# Patient Record
Sex: MALE | Race: WHITE | Employment: OTHER | URBAN - METROPOLITAN AREA
[De-identification: names, ages, dates, MRNs, and addresses within clinical notes are randomized per-mention and may not be internally consistent; named-entity substitution may affect disease eponyms.]

---

## 2017-01-03 ENCOUNTER — TELEPHONE (OUTPATIENT)
Dept: PRIMARY CARE CLINIC | Age: 82
End: 2017-01-03

## 2017-03-01 RX ORDER — METOPROLOL SUCCINATE 50 MG/1
TABLET, EXTENDED RELEASE ORAL
Qty: 90 TABLET | Refills: 1 | Status: SHIPPED | OUTPATIENT
Start: 2017-03-01 | End: 2017-07-20 | Stop reason: SDUPTHER

## 2017-03-07 DIAGNOSIS — E78.00 HYPERCHOLESTEROLEMIA: ICD-10-CM

## 2017-03-08 RX ORDER — ATORVASTATIN CALCIUM 10 MG/1
TABLET, FILM COATED ORAL
Qty: 90 TABLET | Refills: 1 | Status: SHIPPED | OUTPATIENT
Start: 2017-03-08 | End: 2017-07-20 | Stop reason: SDUPTHER

## 2017-03-20 RX ORDER — TRAMADOL HYDROCHLORIDE 50 MG/1
TABLET ORAL
Qty: 100 TABLET | Refills: 0 | Status: SHIPPED | OUTPATIENT
Start: 2017-03-20 | End: 2017-07-20 | Stop reason: SDUPTHER

## 2017-03-31 PROBLEM — M96.1 POSTLAMINECTOMY SYNDROME, LUMBAR: Status: ACTIVE | Noted: 2017-03-31

## 2017-03-31 PROBLEM — R10.31 ABDOMINAL WALL PAIN IN RIGHT LOWER QUADRANT: Status: ACTIVE | Noted: 2017-03-31

## 2017-04-03 PROBLEM — R26.9 ABNORMALITY OF GAIT: Status: ACTIVE | Noted: 2017-04-03

## 2017-04-29 RX ORDER — TAMSULOSIN HYDROCHLORIDE 0.4 MG/1
CAPSULE ORAL
Qty: 90 CAPSULE | Refills: 1 | Status: SHIPPED | OUTPATIENT
Start: 2017-04-29 | End: 2017-07-20 | Stop reason: SDUPTHER

## 2017-06-19 ENCOUNTER — TELEPHONE (OUTPATIENT)
Dept: PRIMARY CARE CLINIC | Age: 82
End: 2017-06-19

## 2017-07-11 RX ORDER — TRAMADOL HYDROCHLORIDE 50 MG/1
TABLET ORAL
Qty: 100 TABLET | Refills: 0 | OUTPATIENT
Start: 2017-07-11

## 2017-07-20 ENCOUNTER — OFFICE VISIT (OUTPATIENT)
Dept: PRIMARY CARE CLINIC | Age: 82
End: 2017-07-20

## 2017-07-20 VITALS
HEIGHT: 77 IN | RESPIRATION RATE: 14 BRPM | BODY MASS INDEX: 27.04 KG/M2 | HEART RATE: 78 BPM | SYSTOLIC BLOOD PRESSURE: 136 MMHG | WEIGHT: 229 LBS | TEMPERATURE: 97 F | OXYGEN SATURATION: 97 % | DIASTOLIC BLOOD PRESSURE: 92 MMHG

## 2017-07-20 DIAGNOSIS — Z12.5 SCREENING FOR PROSTATE CANCER: ICD-10-CM

## 2017-07-20 DIAGNOSIS — M51.36 DDD (DEGENERATIVE DISC DISEASE), LUMBAR: ICD-10-CM

## 2017-07-20 DIAGNOSIS — E78.5 DYSLIPIDEMIA: ICD-10-CM

## 2017-07-20 DIAGNOSIS — N40.0 BENIGN NON-NODULAR PROSTATIC HYPERPLASIA, PRESENCE OF LOWER URINARY TRACT SYMPTOMS UNSPECIFIED: ICD-10-CM

## 2017-07-20 DIAGNOSIS — R73.9 HYPERGLYCEMIA: ICD-10-CM

## 2017-07-20 DIAGNOSIS — E78.00 HYPERCHOLESTEROLEMIA: ICD-10-CM

## 2017-07-20 DIAGNOSIS — R73.9 HYPERGLYCEMIA: Primary | ICD-10-CM

## 2017-07-20 DIAGNOSIS — I10 HTN (HYPERTENSION), BENIGN: ICD-10-CM

## 2017-07-20 DIAGNOSIS — K21.9 GASTROESOPHAGEAL REFLUX DISEASE WITHOUT ESOPHAGITIS: ICD-10-CM

## 2017-07-20 LAB
ALBUMIN SERPL-MCNC: 4.5 G/DL (ref 3.9–4.9)
ALP BLD-CCNC: 55 U/L (ref 35–104)
ALT SERPL-CCNC: 14 U/L (ref 0–41)
ANION GAP SERPL CALCULATED.3IONS-SCNC: 15 MEQ/L (ref 7–13)
AST SERPL-CCNC: 18 U/L (ref 0–40)
BILIRUB SERPL-MCNC: 0.3 MG/DL (ref 0–1.2)
BILIRUBIN, POC: NORMAL
BLOOD URINE, POC: NORMAL
BUN BLDV-MCNC: 24 MG/DL (ref 8–23)
CALCIUM SERPL-MCNC: 9.2 MG/DL (ref 8.6–10.2)
CHLORIDE BLD-SCNC: 99 MEQ/L (ref 98–107)
CHOLESTEROL, TOTAL: 165 MG/DL (ref 0–199)
CLARITY, POC: CLEAR
CO2: 21 MEQ/L (ref 22–29)
COLOR, POC: YELLOW
CREAT SERPL-MCNC: 1.28 MG/DL (ref 0.7–1.2)
CREATININE URINE: 75.5 MG/DL
GFR AFRICAN AMERICAN: >60
GFR NON-AFRICAN AMERICAN: 54.1
GLOBULIN: 2.7 G/DL (ref 2.3–3.5)
GLUCOSE BLD-MCNC: 94 MG/DL (ref 74–109)
GLUCOSE URINE, POC: NORMAL
HBA1C MFR BLD: 6.4 % (ref 4.8–5.9)
HDLC SERPL-MCNC: 53 MG/DL (ref 40–59)
KETONES, POC: NORMAL
LDL CHOLESTEROL CALCULATED: 92 MG/DL (ref 0–129)
LEUKOCYTE EST, POC: NORMAL
MICROALBUMIN UR-MCNC: 9.5 MG/DL
MICROALBUMIN/CREAT UR-RTO: 125.8 MG/G (ref 0–30)
NITRITE, POC: NORMAL
PH, POC: 6
POTASSIUM SERPL-SCNC: 4.4 MEQ/L (ref 3.5–5.1)
PROSTATE SPECIFIC ANTIGEN: 7.42 NG/ML (ref 0–6.22)
PROTEIN, POC: NORMAL
SODIUM BLD-SCNC: 135 MEQ/L (ref 132–144)
SPECIFIC GRAVITY, POC: 1.02
TOTAL PROTEIN: 7.2 G/DL (ref 6.4–8.1)
TRIGL SERPL-MCNC: 102 MG/DL (ref 0–200)
UROBILINOGEN, POC: 3.5

## 2017-07-20 PROCEDURE — 99213 OFFICE O/P EST LOW 20 MIN: CPT | Performed by: FAMILY MEDICINE

## 2017-07-20 PROCEDURE — 81002 URINALYSIS NONAUTO W/O SCOPE: CPT | Performed by: FAMILY MEDICINE

## 2017-07-20 PROCEDURE — 93000 ELECTROCARDIOGRAM COMPLETE: CPT | Performed by: FAMILY MEDICINE

## 2017-07-20 RX ORDER — TRAMADOL HYDROCHLORIDE 50 MG/1
TABLET ORAL
Qty: 100 TABLET | Refills: 0 | Status: SHIPPED | OUTPATIENT
Start: 2017-07-20 | End: 2017-11-21

## 2017-07-20 RX ORDER — TAMSULOSIN HYDROCHLORIDE 0.4 MG/1
CAPSULE ORAL
Qty: 90 CAPSULE | Refills: 1 | Status: SHIPPED | OUTPATIENT
Start: 2017-07-20 | End: 2018-03-02 | Stop reason: SDUPTHER

## 2017-07-20 RX ORDER — ESOMEPRAZOLE MAGNESIUM 40 MG/1
40 CAPSULE, DELAYED RELEASE ORAL
Qty: 90 CAPSULE | Refills: 1 | Status: SHIPPED | OUTPATIENT
Start: 2017-07-20 | End: 2017-11-21 | Stop reason: SDUPTHER

## 2017-07-20 RX ORDER — METOPROLOL SUCCINATE 50 MG/1
TABLET, EXTENDED RELEASE ORAL
Qty: 90 TABLET | Refills: 1 | Status: SHIPPED | OUTPATIENT
Start: 2017-07-20 | End: 2018-03-02 | Stop reason: SDUPTHER

## 2017-07-20 RX ORDER — ATORVASTATIN CALCIUM 10 MG/1
TABLET, FILM COATED ORAL
Qty: 90 TABLET | Refills: 1 | Status: SHIPPED | OUTPATIENT
Start: 2017-07-20 | End: 2018-03-02 | Stop reason: SDUPTHER

## 2017-07-20 RX ORDER — FINASTERIDE 5 MG/1
TABLET, FILM COATED ORAL
Qty: 90 TABLET | Refills: 1 | Status: SHIPPED | OUTPATIENT
Start: 2017-07-20 | End: 2018-01-11 | Stop reason: SDUPTHER

## 2017-07-20 ASSESSMENT — ENCOUNTER SYMPTOMS
ABDOMINAL DISTENTION: 1
SHORTNESS OF BREATH: 1
BLOOD IN STOOL: 0
ABDOMINAL PAIN: 1
COUGH: 0
CHEST TIGHTNESS: 0

## 2017-07-20 ASSESSMENT — PATIENT HEALTH QUESTIONNAIRE - PHQ9
SUM OF ALL RESPONSES TO PHQ QUESTIONS 1-9: 0
1. LITTLE INTEREST OR PLEASURE IN DOING THINGS: 0
2. FEELING DOWN, DEPRESSED OR HOPELESS: 0
SUM OF ALL RESPONSES TO PHQ9 QUESTIONS 1 & 2: 0

## 2017-09-19 ENCOUNTER — TELEPHONE (OUTPATIENT)
Dept: PRIMARY CARE CLINIC | Age: 82
End: 2017-09-19

## 2017-11-21 ENCOUNTER — OFFICE VISIT (OUTPATIENT)
Dept: PRIMARY CARE CLINIC | Age: 82
End: 2017-11-21

## 2017-11-21 VITALS
SYSTOLIC BLOOD PRESSURE: 134 MMHG | RESPIRATION RATE: 16 BRPM | BODY MASS INDEX: 26.21 KG/M2 | TEMPERATURE: 97.4 F | DIASTOLIC BLOOD PRESSURE: 76 MMHG | WEIGHT: 222 LBS | HEART RATE: 88 BPM | HEIGHT: 77 IN

## 2017-11-21 DIAGNOSIS — R97.20 ELEVATED PSA: ICD-10-CM

## 2017-11-21 DIAGNOSIS — R73.9 HYPERGLYCEMIA: ICD-10-CM

## 2017-11-21 DIAGNOSIS — K21.9 GASTROESOPHAGEAL REFLUX DISEASE WITHOUT ESOPHAGITIS: ICD-10-CM

## 2017-11-21 DIAGNOSIS — G89.4 CHRONIC PAIN DISORDER: ICD-10-CM

## 2017-11-21 DIAGNOSIS — M47.10 OSTEOARTHRITIS OF SPINE WITH MYELOPATHY, UNSPECIFIED SPINAL REGION: Primary | ICD-10-CM

## 2017-11-21 DIAGNOSIS — Z12.5 PROSTATE CANCER SCREENING: ICD-10-CM

## 2017-11-21 DIAGNOSIS — E78.00 HYPERCHOLESTEROLEMIA: ICD-10-CM

## 2017-11-21 DIAGNOSIS — Z23 NEED FOR VACCINATION FOR STREP PNEUMONIAE: ICD-10-CM

## 2017-11-21 DIAGNOSIS — F51.03 PARADOXICAL INSOMNIA: ICD-10-CM

## 2017-11-21 PROBLEM — E78.5 DYSLIPIDEMIA: Status: RESOLVED | Noted: 2017-07-20 | Resolved: 2017-11-21

## 2017-11-21 LAB
ALBUMIN SERPL-MCNC: 4.2 G/DL (ref 3.9–4.9)
ALP BLD-CCNC: 50 U/L (ref 35–104)
ALT SERPL-CCNC: 16 U/L (ref 0–41)
ANION GAP SERPL CALCULATED.3IONS-SCNC: 15 MEQ/L (ref 7–13)
AST SERPL-CCNC: 19 U/L (ref 0–40)
BILIRUB SERPL-MCNC: 0.3 MG/DL (ref 0–1.2)
BUN BLDV-MCNC: 19 MG/DL (ref 8–23)
CALCIUM SERPL-MCNC: 9.6 MG/DL (ref 8.6–10.2)
CHLORIDE BLD-SCNC: 99 MEQ/L (ref 98–107)
CO2: 23 MEQ/L (ref 22–29)
CREAT SERPL-MCNC: 1.22 MG/DL (ref 0.7–1.2)
GFR AFRICAN AMERICAN: >60
GFR NON-AFRICAN AMERICAN: 57.1
GLOBULIN: 2.6 G/DL (ref 2.3–3.5)
GLUCOSE BLD-MCNC: 116 MG/DL
GLUCOSE BLD-MCNC: 98 MG/DL (ref 74–109)
HBA1C MFR BLD: 5.7 %
POTASSIUM SERPL-SCNC: 4.8 MEQ/L (ref 3.5–5.1)
PROSTATE SPECIFIC ANTIGEN: 10.66 NG/ML (ref 0–6.22)
SODIUM BLD-SCNC: 137 MEQ/L (ref 132–144)
TOTAL PROTEIN: 6.8 G/DL (ref 6.4–8.1)

## 2017-11-21 PROCEDURE — 82962 GLUCOSE BLOOD TEST: CPT | Performed by: FAMILY MEDICINE

## 2017-11-21 PROCEDURE — 99214 OFFICE O/P EST MOD 30 MIN: CPT | Performed by: FAMILY MEDICINE

## 2017-11-21 PROCEDURE — 90471 IMMUNIZATION ADMIN: CPT | Performed by: FAMILY MEDICINE

## 2017-11-21 PROCEDURE — 83036 HEMOGLOBIN GLYCOSYLATED A1C: CPT | Performed by: FAMILY MEDICINE

## 2017-11-21 PROCEDURE — 90732 PPSV23 VACC 2 YRS+ SUBQ/IM: CPT | Performed by: FAMILY MEDICINE

## 2017-11-21 RX ORDER — ZOLPIDEM TARTRATE 10 MG/1
10 TABLET ORAL NIGHTLY PRN
COMMUNITY
End: 2017-11-21 | Stop reason: SDUPTHER

## 2017-11-21 RX ORDER — ZOLPIDEM TARTRATE 10 MG/1
10 TABLET ORAL NIGHTLY PRN
Qty: 30 TABLET | Refills: 1 | Status: SHIPPED | OUTPATIENT
Start: 2017-11-21 | End: 2018-03-21 | Stop reason: SDUPTHER

## 2017-11-21 RX ORDER — HYDROCODONE BITARTRATE AND ACETAMINOPHEN 5; 325 MG/1; MG/1
1 TABLET ORAL EVERY 6 HOURS PRN
Qty: 28 TABLET | Refills: 0 | Status: SHIPPED | OUTPATIENT
Start: 2017-11-21 | End: 2018-03-21 | Stop reason: SDUPTHER

## 2017-11-21 RX ORDER — ESOMEPRAZOLE MAGNESIUM 40 MG/1
40 CAPSULE, DELAYED RELEASE ORAL
Qty: 90 CAPSULE | Refills: 1 | Status: SHIPPED | OUTPATIENT
Start: 2017-11-21 | End: 2018-05-16 | Stop reason: SDUPTHER

## 2017-11-21 ASSESSMENT — ENCOUNTER SYMPTOMS
WHEEZING: 0
ABDOMINAL PAIN: 1
SHORTNESS OF BREATH: 0

## 2017-11-21 NOTE — PROGRESS NOTES
Subjective  Harley Sis, [de-identified] y.o. male presents 11/21/2017 with  Chief Complaint   Patient presents with    Diabetes     Patient is here for a follow up on diabetes. He does not check his sugar at home. He currently taking Metformin for his sugar. He denies any cp, dizziness and headaches. He does have occasional sob.  Discuss Medications     Patient would like to change Tramadol to Oxycodone. The Tramadol causes nausea. HPI  Patient comes in for follow-up on diabetes and also type II. He admits he does not monitor his blood sugar on a daily basis. He denies any hypoglycemic reactions. He denies any headaches, nausea, emesis, abdominal pain, shortness of breath or chest pain. Patient wishes to review medications as he admits he continues with right lower abdominal pain. He is status post ventral herniorrhaphy. Patient would like to go from tramadol to oxycodone. HPI    Patient Histories  Past Medical History:   Diagnosis Date    Constipation x 2weeks 6/18/2014    Diverticulosis of sigmoid colon 02/03/2016    DR. MANZO    Hypercholesterolemia     LBP (low back pain) 6/18/2014    LBP (low back pain) 6/18/2014    OA (osteoarthritis)     Polyp, colonic 02/03/2016    DR. MANZO    Smoking hx     Tinea manuum 7/22/2015    Type II or unspecified type diabetes mellitus without mention of complication, not stated as uncontrolled      Past Surgical History:   Procedure Laterality Date    CAROTID ENDARTERECTOMY  8/2/12    CATARACT REMOVAL WITH IMPLANT  9/9/13    [facility] DR. GRIDER     COLONOSCOPY  10/18/2012    [facility]    [facility]    WISDOM TOOTH EXTRACTION  1952     No Known Allergies  Social History     Social History    Marital status: Single     Spouse name: N/A    Number of children: N/A    Years of education: N/A     Occupational History    Not on file.      Social History Main Topics    Smoking status: Former Smoker     Packs/day: 0.50     Types: Cigarettes    Smokeless tobacco: Normal range of motion. Neck supple. No JVD present. No thyromegaly present. Cardiovascular: Normal rate, regular rhythm, normal heart sounds and intact distal pulses. Exam reveals no gallop. No murmur heard. Pulmonary/Chest: Effort normal and breath sounds normal. No respiratory distress. He has no wheezes. Abdominal: Soft. Bowel sounds are normal. He exhibits no distension. There is no tenderness. Musculoskeletal: Normal range of motion. He exhibits no edema. Lymphadenopathy:     He has no cervical adenopathy. Neurological: He is alert and oriented to person, place, and time. He has normal reflexes. No cranial nerve deficit. Skin: Skin is warm and dry. No rash noted. Psychiatric: He has a normal mood and affect. Thought content normal.   Nursing note and vitals reviewed. Assessment & Plan   1. Osteoarthritis of spine with myelopathy, unspecified spinal region  Referral To Unknown External Pain Management   2. Gastroesophageal reflux disease without esophagitis  esomeprazole (NEXIUM) 40 MG delayed release capsule   3. Hyperglycemia  POCT glycosylated hemoglobin (Hb A1C)    POCT Glucose    CANCELED: Hemoglobin A1C   4. Hypercholesterolemia  Comprehensive Metabolic Panel   5. Chronic pain disorder  Referral To Unknown External Pain Management    CANCELED: [employer], - (ARELI) Enrrique Breaux DO   6. Need for vaccination for Strep pneumoniae     7. Prostate cancer screening  Psa screening   8.  Elevated PSA  [address] MD [facility] Urology     Orders Placed This Encounter   Procedures    Pneumococcal polysaccharide vaccine 23-valent greater than or equal to 1yo subcutaneous/IM     [id number]    Comprehensive Metabolic Panel     Standing Status:   Future     Number of Occurrences:   1     Standing Expiration Date:   12/21/2017    Psa screening     Standing Status:   Future     Number of Occurrences:   1     Standing Expiration Date:   12/21/2017   [facility] - Joey Bhardwaj MD [facility] Urology     Referral Priority:   Routine     Referral Type:   Consult for Advice and Opinion     Referral Reason:   Specialty Services Required     Referred to Provider:   Tammy Waldron MD     Requested Specialty:   Urology     Number of Visits Requested:   1    Referral To Unknown External Pain Management     Referral Priority:   Routine     Referral Type:   Consult for Advice and Opinion     Referral Reason:   Specialty Services Required     Requested Specialty:   Pain Management     Number of Visits Requested:   1    POCT glycosylated hemoglobin (Hb A1C)    POCT Glucose     Orders Placed This Encounter   Medications    zolpidem (AMBIEN) 10 MG tablet     Sig: Take 1 tablet by mouth nightly as needed for Sleep . Dispense:  30 tablet     Refill:  1    esomeprazole (NEXIUM) 40 MG delayed release capsule     Sig: Take 1 capsule by mouth every morning (before breakfast) One capsule every other day. Dispense:  90 capsule     Refill:  1    HYDROcodone-acetaminophen (NORCO) 5-325 MG per tablet     Sig: Take 1 tablet by mouth every 6 hours as needed for Pain . Dispense:  28 tablet     Refill:  0     Medications Discontinued During This Encounter   Medication Reason    guaiFENesin-codeine (CHERATUSSIN AC) 100-10 MG/5ML syrup     lidocaine (XYLOCAINE) 5 % ointment     metFORMIN (GLUCOPHAGE) 500 MG tablet     gabapentin (NEURONTIN) 400 MG capsule     gabapentin (NEURONTIN) 600 MG tablet     traMADol (ULTRAM) 50 MG tablet     zolpidem (AMBIEN) 10 MG tablet Reorder    esomeprazole (NEXIUM) 40 MG delayed release capsule Reorder       PATIENT COUNSELED TO CONTINUE ALL CURRENT MEDS     Outpatient Prescriptions Marked as Taking for the 11/21/17 encounter (Office Visit) with Andrews Hung,    Medication Sig Dispense Refill    zolpidem (AMBIEN) 10 MG tablet Take 1 tablet by mouth nightly as needed for Sleep .  30 tablet 1    esomeprazole (NEXIUM) 40 MG delayed release

## 2017-12-01 ENCOUNTER — TELEPHONE (OUTPATIENT)
Dept: UROLOGY | Age: 82
End: 2017-12-01

## 2017-12-05 ENCOUNTER — TELEPHONE (OUTPATIENT)
Dept: PRIMARY CARE CLINIC | Age: 82
End: 2017-12-05

## 2017-12-05 NOTE — TELEPHONE ENCOUNTER
1 was sent to [facility] on 11/21/17 already.  Pleaase look through the chart and call patient back with Dr Mike Choi information

## 2017-12-31 RX ORDER — NEOMYCIN SULFATE, POLYMYXIN B SULFATE AND DEXAMETHASONE 3.5; 10000; 1 MG/ML; [USP'U]/ML; MG/ML
2 SUSPENSION/ DROPS OPHTHALMIC 4 TIMES DAILY
Qty: 1 BOTTLE | Refills: 0 | OUTPATIENT
Start: 2017-12-31 | End: 2018-01-07

## 2017-12-31 NOTE — TELEPHONE ENCOUNTER
This eyedrop has a steroid and it. Please see if patient is agreeable to antibiotic eyedrop without steroid. Otherwise, he may need to come in to walk-in clinic to be seen.  Thank you

## 2018-01-11 DIAGNOSIS — N40.0 BENIGN NON-NODULAR PROSTATIC HYPERPLASIA: ICD-10-CM

## 2018-01-11 DIAGNOSIS — R73.9 HYPERGLYCEMIA: ICD-10-CM

## 2018-01-11 RX ORDER — FINASTERIDE 5 MG/1
TABLET, FILM COATED ORAL
Qty: 90 TABLET | Refills: 1 | Status: SHIPPED | OUTPATIENT
Start: 2018-01-11 | End: 2018-08-04 | Stop reason: SDUPTHER

## 2018-02-19 RX ORDER — TOBRAMYCIN 3 MG/ML
1 SOLUTION/ DROPS OPHTHALMIC EVERY 4 HOURS
Qty: 1 BOTTLE | Refills: 1 | Status: SHIPPED | OUTPATIENT
Start: 2018-02-19 | End: 2018-03-01

## 2018-02-19 NOTE — TELEPHONE ENCOUNTER
Pt called requesting Opthalmologic Sol due to recurrent eye infection   LAST OV Walk-in-Clinic 12/26/2017

## 2018-03-02 DIAGNOSIS — E78.00 HYPERCHOLESTEROLEMIA: ICD-10-CM

## 2018-03-02 DIAGNOSIS — N40.0 BENIGN NON-NODULAR PROSTATIC HYPERPLASIA: ICD-10-CM

## 2018-03-02 DIAGNOSIS — I10 HTN (HYPERTENSION), BENIGN: ICD-10-CM

## 2018-03-03 RX ORDER — METOPROLOL SUCCINATE 50 MG/1
TABLET, EXTENDED RELEASE ORAL
Qty: 90 TABLET | Refills: 1 | Status: SHIPPED | OUTPATIENT
Start: 2018-03-03 | End: 2018-08-04 | Stop reason: SDUPTHER

## 2018-03-03 RX ORDER — ATORVASTATIN CALCIUM 10 MG/1
TABLET, FILM COATED ORAL
Qty: 90 TABLET | Refills: 1 | Status: SHIPPED | OUTPATIENT
Start: 2018-03-03 | End: 2018-09-26 | Stop reason: SDUPTHER

## 2018-03-03 RX ORDER — TAMSULOSIN HYDROCHLORIDE 0.4 MG/1
CAPSULE ORAL
Qty: 90 CAPSULE | Refills: 1 | Status: SHIPPED | OUTPATIENT
Start: 2018-03-03 | End: 2018-09-26 | Stop reason: SDUPTHER

## 2018-03-21 ENCOUNTER — OFFICE VISIT (OUTPATIENT)
Dept: PRIMARY CARE CLINIC | Age: 83
End: 2018-03-21
Payer: COMMERCIAL

## 2018-03-21 VITALS
BODY MASS INDEX: 26.12 KG/M2 | OXYGEN SATURATION: 97 % | RESPIRATION RATE: 16 BRPM | SYSTOLIC BLOOD PRESSURE: 130 MMHG | TEMPERATURE: 97.2 F | HEART RATE: 71 BPM | WEIGHT: 221.2 LBS | HEIGHT: 77 IN | DIASTOLIC BLOOD PRESSURE: 88 MMHG

## 2018-03-21 DIAGNOSIS — R10.31 ABDOMINAL WALL PAIN IN RIGHT LOWER QUADRANT: ICD-10-CM

## 2018-03-21 DIAGNOSIS — M54.50 CHRONIC BILATERAL LOW BACK PAIN WITHOUT SCIATICA: ICD-10-CM

## 2018-03-21 DIAGNOSIS — G89.29 CHRONIC BILATERAL LOW BACK PAIN WITHOUT SCIATICA: ICD-10-CM

## 2018-03-21 DIAGNOSIS — F51.03 PARADOXICAL INSOMNIA: Primary | ICD-10-CM

## 2018-03-21 DIAGNOSIS — Z12.5 PROSTATE CANCER SCREENING: ICD-10-CM

## 2018-03-21 DIAGNOSIS — M47.10 OSTEOARTHRITIS OF SPINE WITH MYELOPATHY, UNSPECIFIED SPINAL REGION: ICD-10-CM

## 2018-03-21 DIAGNOSIS — R73.9 HYPERGLYCEMIA: ICD-10-CM

## 2018-03-21 LAB
GLUCOSE BLD-MCNC: 124 MG/DL
HBA1C MFR BLD: 5.2 %
PROSTATE SPECIFIC ANTIGEN: 11.89 NG/ML (ref 0–6.22)

## 2018-03-21 PROCEDURE — 83036 HEMOGLOBIN GLYCOSYLATED A1C: CPT | Performed by: FAMILY MEDICINE

## 2018-03-21 PROCEDURE — 99214 OFFICE O/P EST MOD 30 MIN: CPT | Performed by: FAMILY MEDICINE

## 2018-03-21 PROCEDURE — 82962 GLUCOSE BLOOD TEST: CPT | Performed by: FAMILY MEDICINE

## 2018-03-21 RX ORDER — ZOLPIDEM TARTRATE 10 MG/1
10 TABLET ORAL NIGHTLY PRN
Qty: 30 TABLET | Refills: 2 | Status: SHIPPED | OUTPATIENT
Start: 2018-03-21 | End: 2019-02-19 | Stop reason: SDUPTHER

## 2018-03-21 RX ORDER — HYDROCODONE BITARTRATE AND ACETAMINOPHEN 5; 325 MG/1; MG/1
1 TABLET ORAL EVERY 6 HOURS PRN
Qty: 28 TABLET | Refills: 0 | Status: SHIPPED | OUTPATIENT
Start: 2018-03-21 | End: 2018-03-28

## 2018-03-21 ASSESSMENT — ENCOUNTER SYMPTOMS
COLOR CHANGE: 0
EYE PAIN: 0
FACIAL SWELLING: 0
EYE DISCHARGE: 0
APNEA: 0
CHEST TIGHTNESS: 0
CHOKING: 0
VISUAL CHANGE: 0
BLURRED VISION: 0
EYE REDNESS: 0
STRIDOR: 0
ABDOMINAL PAIN: 1
PHOTOPHOBIA: 0
BACK PAIN: 1
NAUSEA: 0
CONSTIPATION: 0
DIARRHEA: 0
WHEEZING: 0

## 2018-03-21 NOTE — PROGRESS NOTES
without mention of complication, not stated as uncontrolled      Past Surgical History:   Procedure Laterality Date    CAROTID ENDARTERECTOMY  8/2/12    CATARACT REMOVAL WITH IMPLANT  9/9/13    [facility] DR. GRIDER     COLONOSCOPY  10/18/2012    [facility]3/16    [facility]    UPPER GASTROINTESTINAL ENDOSCOPY  10/31/2017    DR MANZO   [facility] WISDOM TOOTH EXTRACTION  1950     Family History   Problem Relation Age of Onset    Diabetes Mother     Heart Disease Mother     Allergy (Severe) Mother      Social History     Social History    Marital status: Single     Spouse name: N/A    Number of children: N/A    Years of education: N/A     Occupational History    Not on file. Social History Main Topics    Smoking status: Former Smoker     Packs/day: 0.50     Types: Cigarettes    Smokeless tobacco: Never Used    Alcohol use Yes    Drug use: No    Sexual activity: Yes     Partners: Female     Other Topics Concern    Not on file     Social History Narrative    No narrative on file     Allergies:  Patient has no known allergies. Review of Systems   Constitutional: Negative for activity change, appetite change, chills, diaphoresis, fatigue, fever and weight loss. HENT: Negative for congestion, ear discharge, ear pain, facial swelling, hearing loss and mouth sores. Eyes: Negative for blurred vision, photophobia, pain, discharge and redness. Respiratory: Negative for apnea, choking, chest tightness, wheezing and stridor. Cardiovascular: Negative for chest pain, palpitations and leg swelling. Gastrointestinal: Positive for abdominal pain. Negative for constipation, diarrhea and nausea. Endocrine: Negative for cold intolerance, heat intolerance, polydipsia, polyphagia and polyuria. Genitourinary: Negative for dysuria and frequency. Musculoskeletal: Positive for back pain. Negative for gait problem, joint swelling, neck pain and neck stiffness.    Skin: Negative for color change, pallor, rash and wound. Allergic/Immunologic: Negative for immunocompromised state. Neurological: Negative for dizziness, tremors, seizures, syncope, facial asymmetry, speech difficulty, weakness and headaches. Psychiatric/Behavioral: Negative for confusion and hallucinations. The patient is not nervous/anxious and is not hyperactive. Objective:   /88 (Site: Left Arm, Position: Sitting, Cuff Size: Medium Adult)   Pulse 71   Temp 97.2 °F (36.2 °C) (Tympanic)   Resp 16   Ht 6' 5\" (1.956 m)   Wt 221 lb 3.2 oz (100.3 kg)   SpO2 97%   BMI 26.23 kg/m²     Physical Exam   Constitutional: He is oriented to person, place, and time. He appears well-developed and well-nourished. HENT:   Head: Normocephalic and atraumatic. Eyes: Conjunctivae and EOM are normal. Pupils are equal, round, and reactive to light. Neck: Normal range of motion. Neck supple. No JVD present. No thyromegaly present. Cardiovascular: Normal rate, regular rhythm, normal heart sounds and intact distal pulses. Exam reveals no gallop. No murmur heard. Pulmonary/Chest: Effort normal and breath sounds normal. No respiratory distress. He has no wheezes. Abdominal: Soft. Bowel sounds are normal. He exhibits no distension. There is no tenderness. Musculoskeletal: Normal range of motion. He exhibits no edema. Lymphadenopathy:     He has no cervical adenopathy. Neurological: He is alert and oriented to person, place, and time. He has normal reflexes. No cranial nerve deficit. Skin: Skin is warm and dry. No rash noted. Psychiatric: He has a normal mood and affect. Thought content normal.   Nursing note and vitals reviewed. Assessment:     1. Paradoxical insomnia  zolpidem (AMBIEN) 10 MG tablet   2. Hyperglycemia  POCT glycosylated hemoglobin (Hb A1C)    POCT Glucose   3. Chronic bilateral low back pain without sciatica     4. Abdominal wall pain in right lower quadrant     5.  Prostate cancer screening  Psa screening    [facility] - Briana Hamm MD [facility] Urology   6. Osteoarthritis of spine with myelopathy, unspecified spinal region  HYDROcodone-acetaminophen (Marthena Seb) 5-325 MG per tablet       Plan:      Orders Placed This Encounter   Procedures    Psa screening     Standing Status:   Future     Number of Occurrences:   1     Standing Expiration Date:   3/21/2019   [address] [place] [address] Urology     Referral Priority:   Routine     Referral Type:   Consult for Advice and Opinion     Referral Reason:   Specialty Services Required     Referred to Provider:   Bradley Clayton MD     Requested Specialty:   Urology     Number of Visits Requested:   1    POCT glycosylated hemoglobin (Hb A1C)    POCT Glucose     Orders Placed This Encounter   Medications    zolpidem (AMBIEN) 10 MG tablet     Sig: Take 1 tablet by mouth nightly as needed for Sleep for up to 30 days. Dispense:  30 tablet     Refill:  2    HYDROcodone-acetaminophen (NORCO) 5-325 MG per tablet     Sig: Take 1 tablet by mouth every 6 hours as needed for Pain for up to 7 days. Dispense:  28 tablet     Refill:  0       Controlled Substances Monitoring:      Return in about 3 months (around 6/21/2018) for Review of Labs & Diagnostic Testing, Routine follow up. Jorge Zavala CMA  , am scribing for and in the presence of Elliott Jauregui DO. Electronically signed by : Melanie Freed CMA        I, Elliott Jauregui DO, personally performed the services described in this documentation, as scribed by Melanie Freed in my presence, and it is both accurate and complete.  Electronically signed by: Elliott Jauregui DO    3/21/18 9:21 PM    Elliott Jauregui DO

## 2018-04-03 RX ORDER — CYCLOBENZAPRINE HCL 10 MG
10 TABLET ORAL 3 TIMES DAILY PRN
Qty: 90 TABLET | Refills: 1 | Status: SHIPPED | OUTPATIENT
Start: 2018-04-03 | End: 2018-04-13

## 2018-04-04 ENCOUNTER — TELEPHONE (OUTPATIENT)
Dept: PRIMARY CARE CLINIC | Age: 83
End: 2018-04-04

## 2018-04-05 ENCOUNTER — OFFICE VISIT (OUTPATIENT)
Dept: PRIMARY CARE CLINIC | Age: 83
End: 2018-04-05
Payer: COMMERCIAL

## 2018-04-05 VITALS
OXYGEN SATURATION: 98 % | BODY MASS INDEX: 27.14 KG/M2 | TEMPERATURE: 97.5 F | HEIGHT: 77 IN | HEART RATE: 54 BPM | RESPIRATION RATE: 15 BRPM | DIASTOLIC BLOOD PRESSURE: 72 MMHG | SYSTOLIC BLOOD PRESSURE: 116 MMHG | WEIGHT: 229.9 LBS

## 2018-04-05 DIAGNOSIS — M50.30 DDD (DEGENERATIVE DISC DISEASE), CERVICAL: Primary | ICD-10-CM

## 2018-04-05 DIAGNOSIS — M47.10 OSTEOARTHRITIS OF SPINE WITH MYELOPATHY, UNSPECIFIED SPINAL REGION: ICD-10-CM

## 2018-04-05 PROCEDURE — 99214 OFFICE O/P EST MOD 30 MIN: CPT | Performed by: FAMILY MEDICINE

## 2018-04-05 RX ORDER — PREDNISONE 10 MG/1
TABLET ORAL
Qty: 24 TABLET | Refills: 0 | Status: SHIPPED | OUTPATIENT
Start: 2018-04-05 | End: 2018-04-19

## 2018-04-05 ASSESSMENT — ENCOUNTER SYMPTOMS
TROUBLE SWALLOWING: 0
BACK PAIN: 0
VISUAL CHANGE: 0
CONSTIPATION: 0
APNEA: 0
GASTROINTESTINAL NEGATIVE: 1
PHOTOPHOBIA: 0
SHORTNESS OF BREATH: 0
BLOOD IN STOOL: 0
EYE DISCHARGE: 0
CHEST TIGHTNESS: 0
ABDOMINAL PAIN: 0
VOMITING: 0
COUGH: 0
RESPIRATORY NEGATIVE: 1
EYES NEGATIVE: 1
DIARRHEA: 0
NAUSEA: 0

## 2018-05-16 DIAGNOSIS — K21.9 GASTROESOPHAGEAL REFLUX DISEASE WITHOUT ESOPHAGITIS: ICD-10-CM

## 2018-05-16 RX ORDER — ESOMEPRAZOLE MAGNESIUM 40 MG/1
CAPSULE, DELAYED RELEASE ORAL
Qty: 90 CAPSULE | Refills: 0 | Status: SHIPPED | OUTPATIENT
Start: 2018-05-16 | End: 2018-08-13 | Stop reason: SDUPTHER

## 2018-06-28 ENCOUNTER — OFFICE VISIT (OUTPATIENT)
Dept: PRIMARY CARE CLINIC | Age: 83
End: 2018-06-28
Payer: COMMERCIAL

## 2018-06-28 VITALS
TEMPERATURE: 96.8 F | SYSTOLIC BLOOD PRESSURE: 122 MMHG | HEART RATE: 72 BPM | DIASTOLIC BLOOD PRESSURE: 78 MMHG | HEIGHT: 77 IN | BODY MASS INDEX: 26.8 KG/M2 | WEIGHT: 227 LBS | OXYGEN SATURATION: 98 % | RESPIRATION RATE: 16 BRPM

## 2018-06-28 DIAGNOSIS — R10.31 ABDOMINAL WALL PAIN IN RIGHT LOWER QUADRANT: Primary | ICD-10-CM

## 2018-06-28 DIAGNOSIS — E78.00 HYPERCHOLESTEROLEMIA: ICD-10-CM

## 2018-06-28 DIAGNOSIS — I10 HTN (HYPERTENSION), BENIGN: ICD-10-CM

## 2018-06-28 DIAGNOSIS — Z12.5 PROSTATE CANCER SCREENING: ICD-10-CM

## 2018-06-28 DIAGNOSIS — R73.9 HYPERGLYCEMIA: ICD-10-CM

## 2018-06-28 DIAGNOSIS — M51.36 DDD (DEGENERATIVE DISC DISEASE), LUMBAR: ICD-10-CM

## 2018-06-28 PROCEDURE — 99214 OFFICE O/P EST MOD 30 MIN: CPT | Performed by: FAMILY MEDICINE

## 2018-06-28 RX ORDER — HYDROCODONE BITARTRATE AND ACETAMINOPHEN 5; 325 MG/1; MG/1
1 TABLET ORAL EVERY 6 HOURS PRN
COMMUNITY
End: 2018-06-28 | Stop reason: SDUPTHER

## 2018-06-28 RX ORDER — HYDROCODONE BITARTRATE AND ACETAMINOPHEN 5; 325 MG/1; MG/1
1 TABLET ORAL EVERY 6 HOURS PRN
Qty: 28 TABLET | Refills: 0 | Status: SHIPPED | OUTPATIENT
Start: 2018-06-28 | End: 2018-10-17 | Stop reason: SDUPTHER

## 2018-06-28 ASSESSMENT — ENCOUNTER SYMPTOMS
VISUAL CHANGE: 0
CONSTIPATION: 0
EYES NEGATIVE: 1
EYE DISCHARGE: 0
SORE THROAT: 0
NAUSEA: 0
SHORTNESS OF BREATH: 1
ORTHOPNEA: 0
VOMITING: 0
DIARRHEA: 0
PHOTOPHOBIA: 0
BLOOD IN STOOL: 0
GASTROINTESTINAL NEGATIVE: 1
ABDOMINAL PAIN: 0
BACK PAIN: 0
SWOLLEN GLANDS: 0
CHEST TIGHTNESS: 0
APNEA: 0
COUGH: 0
CHANGE IN BOWEL HABIT: 0
BLURRED VISION: 0

## 2018-06-28 NOTE — PROGRESS NOTES
Subjective:      Patient ID: Mae Parker is a [de-identified] y.o. male who presents today for:  Chief Complaint   Patient presents with    Hypertension     follow up. currently taking metoprolol. denies any adverse symptoms but c/o SOB on exertion    Hyperglycemia     follow up. patient is currently taking metformin. Patient denies checking sugar at home. Denies any adverse symptoms. Hypertension   This is a recurrent problem. The current episode started more than 1 year ago. The problem has been rapidly improving since onset. The problem is controlled. Associated symptoms include shortness of breath. Pertinent negatives include no anxiety, blurred vision, chest pain, headaches, malaise/fatigue, neck pain, orthopnea, palpitations, peripheral edema, PND or sweats. Treatments tried: metoprolol. The current treatment provides significant improvement. Hyperglycemia   This is a recurrent problem. The problem has been rapidly improving. Pertinent negatives include no abdominal pain, anorexia, arthralgias, change in bowel habit, chest pain, chills, congestion, coughing, diaphoresis, fatigue, fever, headaches, joint swelling, myalgias, nausea, neck pain, numbness, rash, sore throat, swollen glands, urinary symptoms, vertigo, visual change, vomiting or weakness. Treatments tried: metformin. The treatment provided significant relief. Past Medical History:   Diagnosis Date    Constipation x 2weeks 6/18/2014    Diverticulosis of sigmoid colon 02/03/2016    DR. MANZO    Hypercholesterolemia     LBP (low back pain) 6/18/2014    LBP (low back pain) 6/18/2014    OA (osteoarthritis)     Polyp, colonic 02/03/2016    DR. MANZO    Smoking hx     Tinea manuum 7/22/2015    Type II or unspecified type diabetes mellitus without mention of complication, not stated as uncontrolled      Past Surgical History:   Procedure Laterality Date    CAROTID ENDARTERECTOMY  8/2/12    CATARACT REMOVAL WITH IMPLANT  9/9/13    [facility]  122/78 (Site: Left Arm, Position: Sitting, Cuff Size: Medium Adult)   Pulse 72   Temp 96.8 °F (36 °C) (Tympanic)   Resp 16   Ht 6' 5\" (1.956 m)   Wt 227 lb (103 kg)   SpO2 98%   BMI 26.92 kg/m²     Physical Exam   Constitutional: He is oriented to person, place, and time. He appears well-developed and well-nourished. HENT:   Head: Normocephalic and atraumatic. Eyes: Conjunctivae and EOM are normal. Pupils are equal, round, and reactive to light. Neck: Normal range of motion. Neck supple. No JVD present. No thyromegaly present. Cardiovascular: Normal rate, regular rhythm, normal heart sounds and intact distal pulses. Exam reveals no gallop. No murmur heard. Pulmonary/Chest: Effort normal and breath sounds normal. No respiratory distress. He has no wheezes. Abdominal: Soft. Bowel sounds are normal. He exhibits no distension. There is no tenderness. Musculoskeletal: Normal range of motion. He exhibits no edema. Lymphadenopathy:     He has no cervical adenopathy. Neurological: He is alert and oriented to person, place, and time. He has normal reflexes. No cranial nerve deficit. Skin: Skin is warm and dry. No rash noted. Psychiatric: He has a normal mood and affect. Thought content normal.   Nursing note and vitals reviewed. Assessment:      Diagnosis Orders   1. Abdominal wall pain in right lower quadrant  HYDROcodone-acetaminophen (NORCO) 5-325 MG per tablet   2. Hypercholesterolemia  Comprehensive Metabolic Panel   3. HTN (hypertension), benign     4. Hyperglycemia     5. Prostate cancer screening  Psa screening   6.  DDD (degenerative disc disease), lumbar  External Referral to Physical Therapy       Plan:      Orders Placed This Encounter   Procedures    Comprehensive Metabolic Panel     Standing Status:   Future     Standing Expiration Date:   7/28/2018    Psa screening     Standing Status:   Future     Standing Expiration Date:   7/28/2018    External Referral to Physical Therapy     Referral Priority:   Routine     Referral Type:   Eval and Treat     Referral Reason:   Specialty Services Required     Requested Specialty:   Physical Therapy     Number of Visits Requested:   1     Orders Placed This Encounter   Medications    HYDROcodone-acetaminophen (NORCO) 5-325 MG per tablet     Sig: Take 1 tablet by mouth every 6 hours as needed for Pain for up to 7 days. Mirta Woodrow Date: 6/28/18     Dispense:  28 tablet     Refill:  0       Controlled Substances Monitoring:      Return in about 6 months (around 12/28/2018) for Routine follow up, Review of [facility]. RIAN Bruno, CMA  , am scribing for and in the presence of Evelio Case DO. Electronically signed by :  RIAN Reyna, [address] [facility], Evelio Case DO, personally performed the services described in this documentation, as scribed by Sb Mendoza in my presence, and it is both accurate and complete.  Electronically signed by: DO Evelio Collazo DO

## 2018-08-04 DIAGNOSIS — N40.0 BENIGN NON-NODULAR PROSTATIC HYPERPLASIA: ICD-10-CM

## 2018-08-04 DIAGNOSIS — R73.9 HYPERGLYCEMIA: ICD-10-CM

## 2018-08-04 DIAGNOSIS — I10 HTN (HYPERTENSION), BENIGN: ICD-10-CM

## 2018-08-05 RX ORDER — FINASTERIDE 5 MG/1
TABLET, FILM COATED ORAL
Qty: 90 TABLET | Refills: 1 | Status: SHIPPED | OUTPATIENT
Start: 2018-08-05 | End: 2019-02-04 | Stop reason: SDUPTHER

## 2018-08-05 RX ORDER — METOPROLOL SUCCINATE 50 MG/1
TABLET, EXTENDED RELEASE ORAL
Qty: 90 TABLET | Refills: 1 | Status: SHIPPED | OUTPATIENT
Start: 2018-08-05 | End: 2019-02-04 | Stop reason: SDUPTHER

## 2018-08-13 ENCOUNTER — TELEPHONE (OUTPATIENT)
Dept: PRIMARY CARE CLINIC | Age: 83
End: 2018-08-13

## 2018-08-13 DIAGNOSIS — K21.9 GASTROESOPHAGEAL REFLUX DISEASE WITHOUT ESOPHAGITIS: ICD-10-CM

## 2018-08-13 RX ORDER — ESOMEPRAZOLE MAGNESIUM 40 MG/1
CAPSULE, DELAYED RELEASE ORAL
Qty: 90 CAPSULE | Refills: 1 | Status: SHIPPED | OUTPATIENT
Start: 2018-08-13 | End: 2019-02-24 | Stop reason: SDUPTHER

## 2018-08-13 NOTE — TELEPHONE ENCOUNTER
Patient was last seen on 6-28-18  Last Prescribed 5-16-18    Medication is pending  Please approve or deny this request

## 2018-09-26 DIAGNOSIS — N40.0 BENIGN NON-NODULAR PROSTATIC HYPERPLASIA: ICD-10-CM

## 2018-09-26 DIAGNOSIS — E78.00 HYPERCHOLESTEROLEMIA: ICD-10-CM

## 2018-09-26 RX ORDER — TAMSULOSIN HYDROCHLORIDE 0.4 MG/1
CAPSULE ORAL
Qty: 90 CAPSULE | Refills: 0 | Status: SHIPPED | OUTPATIENT
Start: 2018-09-26 | End: 2018-12-26 | Stop reason: SDUPTHER

## 2018-09-26 RX ORDER — ATORVASTATIN CALCIUM 10 MG/1
TABLET, FILM COATED ORAL
Qty: 90 TABLET | Refills: 0 | Status: SHIPPED | OUTPATIENT
Start: 2018-09-26 | End: 2018-12-26 | Stop reason: SDUPTHER

## 2018-10-15 ENCOUNTER — TELEPHONE (OUTPATIENT)
Dept: PRIMARY CARE CLINIC | Age: 83
End: 2018-10-15

## 2018-10-16 NOTE — TELEPHONE ENCOUNTER
Usually urology will run the show and get PSA when needed. Please let me know. We still track PSAs given the patient's who've been diagnosed with prostate cancer.  Thank you

## 2018-10-17 ENCOUNTER — OFFICE VISIT (OUTPATIENT)
Dept: PRIMARY CARE CLINIC | Age: 83
End: 2018-10-17
Payer: COMMERCIAL

## 2018-10-17 VITALS
WEIGHT: 229.2 LBS | HEIGHT: 77 IN | TEMPERATURE: 97.1 F | OXYGEN SATURATION: 96 % | RESPIRATION RATE: 16 BRPM | BODY MASS INDEX: 27.06 KG/M2 | HEART RATE: 73 BPM | DIASTOLIC BLOOD PRESSURE: 84 MMHG | SYSTOLIC BLOOD PRESSURE: 124 MMHG

## 2018-10-17 DIAGNOSIS — R10.31 ABDOMINAL WALL PAIN IN RIGHT LOWER QUADRANT: ICD-10-CM

## 2018-10-17 DIAGNOSIS — Z12.5 PROSTATE CANCER SCREENING: Primary | ICD-10-CM

## 2018-10-17 DIAGNOSIS — M47.10 OSTEOARTHRITIS OF SPINE WITH MYELOPATHY, UNSPECIFIED SPINAL REGION: ICD-10-CM

## 2018-10-17 PROCEDURE — 99213 OFFICE O/P EST LOW 20 MIN: CPT | Performed by: FAMILY MEDICINE

## 2018-10-17 RX ORDER — HYDROCODONE BITARTRATE AND ACETAMINOPHEN 5; 325 MG/1; MG/1
1 TABLET ORAL EVERY 6 HOURS PRN
Qty: 28 TABLET | Refills: 0 | Status: SHIPPED | OUTPATIENT
Start: 2018-10-17 | End: 2018-12-28 | Stop reason: SDUPTHER

## 2018-10-17 ASSESSMENT — ENCOUNTER SYMPTOMS
CHEST TIGHTNESS: 0
GASTROINTESTINAL NEGATIVE: 1
NAUSEA: 0
SHORTNESS OF BREATH: 0
VOMITING: 0
EYES NEGATIVE: 1
COUGH: 0
DIARRHEA: 0
CONSTIPATION: 0
BLOOD IN STOOL: 0
PHOTOPHOBIA: 0
ABDOMINAL PAIN: 0
EYE DISCHARGE: 0
BACK PAIN: 0
APNEA: 0
RESPIRATORY NEGATIVE: 1

## 2018-10-17 NOTE — PROGRESS NOTES
motion. He exhibits no edema. Lymphadenopathy:     He has no cervical adenopathy. Neurological: He is alert and oriented to person, place, and time. He has normal reflexes. No cranial nerve deficit. Skin: Skin is warm and dry. No rash noted. Psychiatric: He has a normal mood and affect. Thought content normal.   Nursing note and vitals reviewed. Assessment:      Diagnosis Orders   1. Prostate cancer screening  PSA Screening    PSA, free    [address] [address] MD [facility] Urology   2. Osteoarthritis of spine with myelopathy, unspecified spinal region     3. Abdominal wall pain in right lower quadrant  HYDROcodone-acetaminophen (NORCO) 5-325 MG per tablet       Plan:      Orders Placed This Encounter   Procedures    PSA Screening     Standing Status:   Future     Standing Expiration Date:   10/17/2019    PSA, free     Standing Status:   Future     Standing Expiration Date:   10/17/2019   [address] [place] [address] Urology     Referral Priority:   Routine     Referral Type:   Eval and Treat     Referral Reason:   Specialty Services Required     Referred to Provider:   Haritha Christianson MD     Requested Specialty:   Urology     Number of Visits Requested:   1     Orders Placed This Encounter   Medications    HYDROcodone-acetaminophen (Brent Sjogren) 5-325 MG per tablet     Sig: Take 1 tablet by mouth every 6 hours as needed for Pain for up to 7 days. .     Dispense:  28 tablet     Refill:  0       Controlled Substances Monitoring:     Return in about 3 months (around 1/17/2019) for Review of Labs & Diagnostic Testing, Routine follow up. RIAN Flowers, CMA  , am scribing for and in the presence of Guillaume Howe DO. Electronically signed by :RIAN Lopez, [address] [facility], Guillaume Howe DO, personally performed the services described in this documentation, as scribed by Acacia Chiu in my presence, and it is both accurate and complete.  Electronically signed by: Guillaume Howe

## 2018-10-18 DIAGNOSIS — Z12.5 PROSTATE CANCER SCREENING: ICD-10-CM

## 2018-10-22 LAB
PROSTATE SPECIFIC ANTIGEN FREE: 1.9 UG/L
PROSTATE SPECIFIC ANTIGEN PERCENT FREE: 12 %
PROSTATE SPECIFIC ANTIGEN: 15.8 UG/L (ref 0–4)

## 2018-10-29 ENCOUNTER — TELEPHONE (OUTPATIENT)
Dept: PRIMARY CARE CLINIC | Age: 83
End: 2018-10-29

## 2018-12-26 DIAGNOSIS — N40.0 BENIGN NON-NODULAR PROSTATIC HYPERPLASIA: ICD-10-CM

## 2018-12-26 DIAGNOSIS — E78.00 HYPERCHOLESTEROLEMIA: ICD-10-CM

## 2018-12-26 RX ORDER — ATORVASTATIN CALCIUM 10 MG/1
TABLET, FILM COATED ORAL
Qty: 90 TABLET | Refills: 0 | Status: SHIPPED | OUTPATIENT
Start: 2018-12-26 | End: 2019-03-25 | Stop reason: SDUPTHER

## 2018-12-26 RX ORDER — TAMSULOSIN HYDROCHLORIDE 0.4 MG/1
CAPSULE ORAL
Qty: 90 CAPSULE | Refills: 0 | Status: SHIPPED | OUTPATIENT
Start: 2018-12-26 | End: 2019-03-29 | Stop reason: SDUPTHER

## 2018-12-28 ENCOUNTER — OFFICE VISIT (OUTPATIENT)
Dept: PRIMARY CARE CLINIC | Age: 83
End: 2018-12-28
Payer: COMMERCIAL

## 2018-12-28 VITALS
HEART RATE: 101 BPM | SYSTOLIC BLOOD PRESSURE: 122 MMHG | DIASTOLIC BLOOD PRESSURE: 80 MMHG | HEIGHT: 77 IN | OXYGEN SATURATION: 96 % | RESPIRATION RATE: 16 BRPM | BODY MASS INDEX: 26.68 KG/M2 | TEMPERATURE: 97.5 F | WEIGHT: 226 LBS

## 2018-12-28 DIAGNOSIS — E78.00 HYPERCHOLESTEROLEMIA: ICD-10-CM

## 2018-12-28 DIAGNOSIS — R10.31 ABDOMINAL WALL PAIN IN RIGHT LOWER QUADRANT: ICD-10-CM

## 2018-12-28 DIAGNOSIS — D51.9 ANEMIA DUE TO VITAMIN B12 DEFICIENCY, UNSPECIFIED B12 DEFICIENCY TYPE: ICD-10-CM

## 2018-12-28 DIAGNOSIS — C61 PROSTATE CANCER (HCC): Primary | ICD-10-CM

## 2018-12-28 LAB
ALBUMIN SERPL-MCNC: 4.1 G/DL (ref 3.9–4.9)
ALP BLD-CCNC: 55 U/L (ref 35–104)
ALT SERPL-CCNC: 14 U/L (ref 0–41)
ANION GAP SERPL CALCULATED.3IONS-SCNC: 14 MEQ/L (ref 7–13)
AST SERPL-CCNC: 18 U/L (ref 0–40)
BASOPHILS ABSOLUTE: 0 K/UL (ref 0–0.2)
BASOPHILS RELATIVE PERCENT: 0.8 %
BILIRUB SERPL-MCNC: 0.5 MG/DL (ref 0–1.2)
BUN BLDV-MCNC: 24 MG/DL (ref 8–23)
CALCIUM SERPL-MCNC: 8.9 MG/DL (ref 8.6–10.2)
CHLORIDE BLD-SCNC: 102 MEQ/L (ref 98–107)
CHOLESTEROL, TOTAL: 129 MG/DL (ref 0–199)
CO2: 21 MEQ/L (ref 22–29)
CREAT SERPL-MCNC: 1.32 MG/DL (ref 0.7–1.2)
EOSINOPHILS ABSOLUTE: 0.1 K/UL (ref 0–0.7)
EOSINOPHILS RELATIVE PERCENT: 1.3 %
GFR AFRICAN AMERICAN: >60
GFR NON-AFRICAN AMERICAN: 52
GLOBULIN: 2.9 G/DL (ref 2.3–3.5)
GLUCOSE BLD-MCNC: 105 MG/DL (ref 74–109)
HCT VFR BLD CALC: 31.4 % (ref 42–52)
HDLC SERPL-MCNC: 51 MG/DL (ref 40–59)
HEMOGLOBIN: 10.4 G/DL (ref 14–18)
LDL CHOLESTEROL CALCULATED: 64 MG/DL (ref 0–129)
LYMPHOCYTES ABSOLUTE: 1.3 K/UL (ref 1–4.8)
LYMPHOCYTES RELATIVE PERCENT: 19.3 %
MCH RBC QN AUTO: 30.8 PG (ref 27–31.3)
MCHC RBC AUTO-ENTMCNC: 33.1 % (ref 33–37)
MCV RBC AUTO: 93 FL (ref 80–100)
MONOCYTES ABSOLUTE: 0.6 K/UL (ref 0.2–0.8)
MONOCYTES RELATIVE PERCENT: 8.7 %
NEUTROPHILS ABSOLUTE: 4.6 K/UL (ref 1.4–6.5)
NEUTROPHILS RELATIVE PERCENT: 69.9 %
PDW BLD-RTO: 15.1 % (ref 11.5–14.5)
PLATELET # BLD: 161 K/UL (ref 130–400)
POTASSIUM SERPL-SCNC: 4.8 MEQ/L (ref 3.5–5.1)
RBC # BLD: 3.38 M/UL (ref 4.7–6.1)
SODIUM BLD-SCNC: 137 MEQ/L (ref 132–144)
TOTAL PROTEIN: 7 G/DL (ref 6.4–8.1)
TRIGL SERPL-MCNC: 70 MG/DL (ref 0–200)
WBC # BLD: 6.6 K/UL (ref 4.8–10.8)

## 2018-12-28 PROCEDURE — 99213 OFFICE O/P EST LOW 20 MIN: CPT | Performed by: FAMILY MEDICINE

## 2018-12-28 RX ORDER — HYDROCODONE BITARTRATE AND ACETAMINOPHEN 5; 325 MG/1; MG/1
1 TABLET ORAL EVERY 6 HOURS PRN
Qty: 28 TABLET | Refills: 0 | Status: SHIPPED | OUTPATIENT
Start: 2018-12-28 | End: 2019-02-19 | Stop reason: SDUPTHER

## 2018-12-28 ASSESSMENT — ENCOUNTER SYMPTOMS
PHOTOPHOBIA: 0
CHEST TIGHTNESS: 0
RESPIRATORY NEGATIVE: 1
COUGH: 0
VOMITING: 0
SHORTNESS OF BREATH: 0
EYE DISCHARGE: 0
DIARRHEA: 0
ABDOMINAL PAIN: 0
BACK PAIN: 0
APNEA: 0
NAUSEA: 0
GASTROINTESTINAL NEGATIVE: 1
CONSTIPATION: 0
BLOOD IN STOOL: 0
EYES NEGATIVE: 1

## 2018-12-28 ASSESSMENT — PATIENT HEALTH QUESTIONNAIRE - PHQ9
SUM OF ALL RESPONSES TO PHQ9 QUESTIONS 1 & 2: 0
SUM OF ALL RESPONSES TO PHQ QUESTIONS 1-9: 0
SUM OF ALL RESPONSES TO PHQ QUESTIONS 1-9: 0
1. LITTLE INTEREST OR PLEASURE IN DOING THINGS: 0
2. FEELING DOWN, DEPRESSED OR HOPELESS: 0

## 2018-12-28 NOTE — PROGRESS NOTES
exhibits no discharge. Neck: Normal range of motion. Neck supple. No JVD present. No thyromegaly present. Cardiovascular: Normal rate, regular rhythm, normal heart sounds and intact distal pulses. Exam reveals no gallop. No murmur heard. Pulmonary/Chest: Effort normal and breath sounds normal. No respiratory distress. He has no wheezes. Abdominal: Soft. Bowel sounds are normal. He exhibits no distension and no mass. There is no tenderness. There is no rebound. Genitourinary: Rectum normal.   Musculoskeletal: Normal range of motion. He exhibits no edema. Lymphadenopathy:     He has no cervical adenopathy. Neurological: He is alert and oriented to person, place, and time. He has normal reflexes. No cranial nerve deficit. Skin: Skin is warm and dry. No rash noted. No pallor. Psychiatric: He has a normal mood and affect. Assessment:      Diagnosis Orders   1. Prostate cancer ([facility] [address]     2. Hypercholesterolemia  Comprehensive Metabolic Panel    Lipid Panel   3. Anemia due to vitamin B12 deficiency, unspecified B12 deficiency type  CBC Auto Differential   4. Abdominal wall pain in right lower quadrant  HYDROcodone-acetaminophen (NORCO) 5-325 MG per tablet       Plan:      Orders Placed This Encounter   Procedures    Comprehensive Metabolic Panel     Standing Status:   Future     Number of Occurrences:   1     Standing Expiration Date:   1/28/2019    CBC Auto Differential     Standing Status:   Future     Number of Occurrences:   1     Standing Expiration Date:   12/28/2019    Lipid Panel     Standing Status:   Future     Number of Occurrences:   1     Standing Expiration Date:   1/28/2019     Order Specific Question:   Is Patient Fasting?/# of Hours     Answer:   yes     Orders Placed This Encounter   Medications    HYDROcodone-acetaminophen ([address]) 5-325 MG per tablet     Sig: Take 1 tablet by mouth every 6 hours as needed for Pain for up to 7 days. .     Dispense:  28 tablet     Refill:  0

## 2018-12-31 ENCOUNTER — TELEPHONE (OUTPATIENT)
Dept: PRIMARY CARE CLINIC | Age: 83
End: 2018-12-31

## 2019-02-04 DIAGNOSIS — I10 HTN (HYPERTENSION), BENIGN: ICD-10-CM

## 2019-02-04 DIAGNOSIS — N40.0 BENIGN NON-NODULAR PROSTATIC HYPERPLASIA: ICD-10-CM

## 2019-02-04 RX ORDER — METOPROLOL SUCCINATE 50 MG/1
TABLET, EXTENDED RELEASE ORAL
Qty: 90 TABLET | Refills: 1 | Status: SHIPPED | OUTPATIENT
Start: 2019-02-04 | End: 2019-04-16 | Stop reason: SDUPTHER

## 2019-02-04 RX ORDER — FINASTERIDE 5 MG/1
TABLET, FILM COATED ORAL
Qty: 90 TABLET | Refills: 1 | Status: SHIPPED | OUTPATIENT
Start: 2019-02-04

## 2019-02-15 ENCOUNTER — TELEPHONE (OUTPATIENT)
Dept: FAMILY MEDICINE CLINIC | Age: 84
End: 2019-02-15

## 2019-02-19 ENCOUNTER — OFFICE VISIT (OUTPATIENT)
Dept: PRIMARY CARE CLINIC | Age: 84
End: 2019-02-19
Payer: COMMERCIAL

## 2019-02-19 VITALS
DIASTOLIC BLOOD PRESSURE: 62 MMHG | RESPIRATION RATE: 12 BRPM | BODY MASS INDEX: 26.33 KG/M2 | TEMPERATURE: 97.2 F | HEART RATE: 54 BPM | SYSTOLIC BLOOD PRESSURE: 122 MMHG | HEIGHT: 77 IN | WEIGHT: 223 LBS | OXYGEN SATURATION: 99 %

## 2019-02-19 DIAGNOSIS — C61 PROSTATE CANCER (HCC): ICD-10-CM

## 2019-02-19 DIAGNOSIS — R06.09 DOE (DYSPNEA ON EXERTION): Primary | ICD-10-CM

## 2019-02-19 DIAGNOSIS — R10.31 ABDOMINAL WALL PAIN IN RIGHT LOWER QUADRANT: ICD-10-CM

## 2019-02-19 DIAGNOSIS — E78.00 HYPERCHOLESTEROLEMIA: ICD-10-CM

## 2019-02-19 DIAGNOSIS — I48.0 PAROXYSMAL ATRIAL FIBRILLATION (HCC): ICD-10-CM

## 2019-02-19 DIAGNOSIS — F51.03 PARADOXICAL INSOMNIA: ICD-10-CM

## 2019-02-19 DIAGNOSIS — R06.02 SOB (SHORTNESS OF BREATH): ICD-10-CM

## 2019-02-19 LAB
ALBUMIN SERPL-MCNC: 4.3 G/DL (ref 3.5–4.6)
ALP BLD-CCNC: 59 U/L (ref 35–104)
ALT SERPL-CCNC: 14 U/L (ref 0–41)
ANION GAP SERPL CALCULATED.3IONS-SCNC: 16 MEQ/L (ref 9–15)
AST SERPL-CCNC: 22 U/L (ref 0–40)
BILIRUB SERPL-MCNC: 0.5 MG/DL (ref 0.2–0.7)
BUN BLDV-MCNC: 23 MG/DL (ref 8–23)
CALCIUM SERPL-MCNC: 9.7 MG/DL (ref 8.5–9.9)
CHLORIDE BLD-SCNC: 97 MEQ/L (ref 95–107)
CO2: 22 MEQ/L (ref 20–31)
CREAT SERPL-MCNC: 1.3 MG/DL (ref 0.7–1.2)
GFR AFRICAN AMERICAN: >60
GFR NON-AFRICAN AMERICAN: 52.9
GLOBULIN: 3.3 G/DL (ref 2.3–3.5)
GLUCOSE BLD-MCNC: 111 MG/DL (ref 70–99)
HCT VFR BLD CALC: 32.8 % (ref 42–52)
HEMOGLOBIN: 10.7 G/DL (ref 14–18)
MCH RBC QN AUTO: 31.1 PG (ref 27–31.3)
MCHC RBC AUTO-ENTMCNC: 32.7 % (ref 33–37)
MCV RBC AUTO: 94.9 FL (ref 80–100)
PDW BLD-RTO: 15.2 % (ref 11.5–14.5)
PLATELET # BLD: 175 K/UL (ref 130–400)
POTASSIUM SERPL-SCNC: 5 MEQ/L (ref 3.4–4.9)
PROSTATE SPECIFIC ANTIGEN: 22.6 NG/ML (ref 0–6.22)
RBC # BLD: 3.45 M/UL (ref 4.7–6.1)
SODIUM BLD-SCNC: 135 MEQ/L (ref 135–144)
TOTAL PROTEIN: 7.6 G/DL (ref 6.3–8)
WBC # BLD: 5.5 K/UL (ref 4.8–10.8)

## 2019-02-19 PROCEDURE — 99214 OFFICE O/P EST MOD 30 MIN: CPT | Performed by: FAMILY MEDICINE

## 2019-02-19 PROCEDURE — 93000 ELECTROCARDIOGRAM COMPLETE: CPT | Performed by: FAMILY MEDICINE

## 2019-02-19 RX ORDER — HYDROCODONE BITARTRATE AND ACETAMINOPHEN 5; 325 MG/1; MG/1
1 TABLET ORAL EVERY 6 HOURS PRN
Qty: 28 TABLET | Refills: 0 | Status: SHIPPED | OUTPATIENT
Start: 2019-02-19 | End: 2019-02-26

## 2019-02-19 RX ORDER — ZOLPIDEM TARTRATE 10 MG/1
10 TABLET ORAL NIGHTLY PRN
Qty: 30 TABLET | Refills: 2 | Status: SHIPPED | OUTPATIENT
Start: 2019-02-19 | End: 2019-03-21

## 2019-02-19 ASSESSMENT — ENCOUNTER SYMPTOMS
EYES NEGATIVE: 1
RHINORRHEA: 0
PHOTOPHOBIA: 0
BLOOD IN STOOL: 0
SPUTUM PRODUCTION: 0
NAUSEA: 0
CONSTIPATION: 0
BELCHING: 0
SORE THROAT: 0
WHEEZING: 0
EYE DISCHARGE: 0
ABDOMINAL PAIN: 1
CHEST TIGHTNESS: 0
SWOLLEN GLANDS: 0
BACK PAIN: 0
HEMOPTYSIS: 0
VOMITING: 0
ORTHOPNEA: 0
APNEA: 0
SHORTNESS OF BREATH: 1
COUGH: 0
HEMATOCHEZIA: 0
FLATUS: 0
DIARRHEA: 0

## 2019-02-19 ASSESSMENT — PATIENT HEALTH QUESTIONNAIRE - PHQ9
SUM OF ALL RESPONSES TO PHQ QUESTIONS 1-9: 1
SUM OF ALL RESPONSES TO PHQ QUESTIONS 1-9: 1
SUM OF ALL RESPONSES TO PHQ9 QUESTIONS 1 & 2: 1
1. LITTLE INTEREST OR PLEASURE IN DOING THINGS: 0
2. FEELING DOWN, DEPRESSED OR HOPELESS: 1

## 2019-02-19 ASSESSMENT — CROHNS DISEASE ACTIVITY INDEX (CDAI): CDAI SCORE: 0

## 2019-02-20 DIAGNOSIS — G89.29 CHRONIC LOW BACK PAIN WITHOUT SCIATICA, UNSPECIFIED BACK PAIN LATERALITY: Primary | ICD-10-CM

## 2019-02-20 DIAGNOSIS — M54.50 CHRONIC LOW BACK PAIN WITHOUT SCIATICA, UNSPECIFIED BACK PAIN LATERALITY: Primary | ICD-10-CM

## 2019-02-20 DIAGNOSIS — R26.9 ABNORMALITY OF GAIT: ICD-10-CM

## 2019-02-20 PROBLEM — R06.02 SOB (SHORTNESS OF BREATH): Status: ACTIVE | Noted: 2019-02-20

## 2019-02-21 ENCOUNTER — OFFICE VISIT (OUTPATIENT)
Dept: CARDIOLOGY CLINIC | Age: 84
End: 2019-02-21
Payer: COMMERCIAL

## 2019-02-21 VITALS
WEIGHT: 223 LBS | DIASTOLIC BLOOD PRESSURE: 82 MMHG | BODY MASS INDEX: 26.33 KG/M2 | RESPIRATION RATE: 20 BRPM | HEART RATE: 53 BPM | OXYGEN SATURATION: 98 % | SYSTOLIC BLOOD PRESSURE: 122 MMHG | HEIGHT: 77 IN

## 2019-02-21 DIAGNOSIS — Z98.890 H/O CAROTID ENDARTERECTOMY: ICD-10-CM

## 2019-02-21 DIAGNOSIS — I25.10 ATHEROSCLEROSIS OF NATIVE CORONARY ARTERY OF NATIVE HEART WITHOUT ANGINA PECTORIS: ICD-10-CM

## 2019-02-21 DIAGNOSIS — I47.20 VT (VENTRICULAR TACHYCARDIA): ICD-10-CM

## 2019-02-21 DIAGNOSIS — I50.42 CHRONIC COMBINED SYSTOLIC AND DIASTOLIC CONGESTIVE HEART FAILURE (HCC): ICD-10-CM

## 2019-02-21 DIAGNOSIS — R06.02 SOB (SHORTNESS OF BREATH): Primary | ICD-10-CM

## 2019-02-21 PROBLEM — I50.40 COMBINED SYSTOLIC AND DIASTOLIC CONGESTIVE HEART FAILURE (HCC): Status: ACTIVE | Noted: 2019-02-21

## 2019-02-21 PROCEDURE — 99243 OFF/OP CNSLTJ NEW/EST LOW 30: CPT | Performed by: INTERNAL MEDICINE

## 2019-02-21 RX ORDER — FUROSEMIDE 20 MG/1
20 TABLET ORAL DAILY PRN
Qty: 30 TABLET | Refills: 11 | Status: SHIPPED | OUTPATIENT
Start: 2019-02-21 | End: 2019-04-19 | Stop reason: SDUPTHER

## 2019-02-21 ASSESSMENT — ENCOUNTER SYMPTOMS
FACIAL SWELLING: 0
SHORTNESS OF BREATH: 1
NAUSEA: 0
CHEST TIGHTNESS: 0
COUGH: 0
BLOOD IN STOOL: 0
VOICE CHANGE: 0
VOMITING: 0
TROUBLE SWALLOWING: 0
ANAL BLEEDING: 0
ABDOMINAL DISTENTION: 0
WHEEZING: 0
DIARRHEA: 0
COLOR CHANGE: 0
APNEA: 0
ABDOMINAL PAIN: 0

## 2019-02-24 DIAGNOSIS — K21.9 GASTROESOPHAGEAL REFLUX DISEASE WITHOUT ESOPHAGITIS: ICD-10-CM

## 2019-02-24 RX ORDER — ESOMEPRAZOLE MAGNESIUM 40 MG/1
CAPSULE, DELAYED RELEASE ORAL
Qty: 90 CAPSULE | Refills: 1 | Status: SHIPPED | OUTPATIENT
Start: 2019-02-24

## 2019-02-26 ENCOUNTER — TELEPHONE (OUTPATIENT)
Dept: PRIMARY CARE CLINIC | Age: 84
End: 2019-02-26

## 2019-03-05 ENCOUNTER — TELEPHONE (OUTPATIENT)
Dept: PRIMARY CARE CLINIC | Age: 84
End: 2019-03-05

## 2019-03-07 ENCOUNTER — TELEPHONE (OUTPATIENT)
Dept: CARDIOLOGY CLINIC | Age: 84
End: 2019-03-07

## 2019-03-14 ENCOUNTER — OFFICE VISIT (OUTPATIENT)
Dept: CARDIOLOGY CLINIC | Age: 84
End: 2019-03-14
Payer: COMMERCIAL

## 2019-03-14 VITALS
WEIGHT: 221 LBS | OXYGEN SATURATION: 94 % | HEART RATE: 62 BPM | DIASTOLIC BLOOD PRESSURE: 80 MMHG | BODY MASS INDEX: 26.09 KG/M2 | RESPIRATION RATE: 22 BRPM | HEIGHT: 77 IN | SYSTOLIC BLOOD PRESSURE: 124 MMHG

## 2019-03-14 DIAGNOSIS — R10.31 ABDOMINAL WALL PAIN IN RIGHT LOWER QUADRANT: ICD-10-CM

## 2019-03-14 DIAGNOSIS — I48.91 ATRIAL FIBRILLATION, UNSPECIFIED TYPE (HCC): Primary | ICD-10-CM

## 2019-03-14 DIAGNOSIS — Z98.890 H/O CAROTID ENDARTERECTOMY: ICD-10-CM

## 2019-03-14 DIAGNOSIS — R06.02 SOB (SHORTNESS OF BREATH): ICD-10-CM

## 2019-03-14 DIAGNOSIS — I50.42 CHRONIC COMBINED SYSTOLIC AND DIASTOLIC CONGESTIVE HEART FAILURE (HCC): ICD-10-CM

## 2019-03-14 PROCEDURE — 99214 OFFICE O/P EST MOD 30 MIN: CPT | Performed by: INTERNAL MEDICINE

## 2019-03-14 RX ORDER — HYDROCODONE BITARTRATE AND ACETAMINOPHEN 5; 325 MG/1; MG/1
1 TABLET ORAL
COMMUNITY
End: 2019-03-26 | Stop reason: SDUPTHER

## 2019-03-14 ASSESSMENT — ENCOUNTER SYMPTOMS
WHEEZING: 0
ABDOMINAL DISTENTION: 0
BLOOD IN STOOL: 0
VOMITING: 0
DIARRHEA: 0
TROUBLE SWALLOWING: 0
SHORTNESS OF BREATH: 1
ANAL BLEEDING: 0
NAUSEA: 0
APNEA: 0
VOICE CHANGE: 0
FACIAL SWELLING: 0
COLOR CHANGE: 0
CHEST TIGHTNESS: 0

## 2019-03-25 DIAGNOSIS — E78.00 HYPERCHOLESTEROLEMIA: ICD-10-CM

## 2019-03-25 RX ORDER — HYDROCODONE BITARTRATE AND ACETAMINOPHEN 5; 325 MG/1; MG/1
1 TABLET ORAL
Status: CANCELLED | OUTPATIENT
Start: 2019-03-25

## 2019-03-25 RX ORDER — ATORVASTATIN CALCIUM 10 MG/1
TABLET, FILM COATED ORAL
Qty: 90 TABLET | Refills: 0 | Status: SHIPPED | OUTPATIENT
Start: 2019-03-25 | End: 2019-06-26 | Stop reason: SDUPTHER

## 2019-03-26 ENCOUNTER — OFFICE VISIT (OUTPATIENT)
Dept: PRIMARY CARE CLINIC | Age: 84
End: 2019-03-26
Payer: COMMERCIAL

## 2019-03-26 ENCOUNTER — TELEPHONE (OUTPATIENT)
Dept: PRIMARY CARE CLINIC | Age: 84
End: 2019-03-26

## 2019-03-26 VITALS
DIASTOLIC BLOOD PRESSURE: 70 MMHG | WEIGHT: 227 LBS | BODY MASS INDEX: 26.8 KG/M2 | OXYGEN SATURATION: 99 % | SYSTOLIC BLOOD PRESSURE: 112 MMHG | RESPIRATION RATE: 16 BRPM | HEIGHT: 77 IN | TEMPERATURE: 97.4 F | HEART RATE: 62 BPM

## 2019-03-26 DIAGNOSIS — D64.9 ANEMIA, UNSPECIFIED TYPE: ICD-10-CM

## 2019-03-26 DIAGNOSIS — R10.84 GENERALIZED ABDOMINAL PAIN: ICD-10-CM

## 2019-03-26 DIAGNOSIS — R06.02 SOB (SHORTNESS OF BREATH) ON EXERTION: ICD-10-CM

## 2019-03-26 DIAGNOSIS — N40.0 BENIGN NON-NODULAR PROSTATIC HYPERPLASIA: ICD-10-CM

## 2019-03-26 DIAGNOSIS — J18.9 PNEUMONIA OF RIGHT MIDDLE LOBE DUE TO INFECTIOUS ORGANISM: ICD-10-CM

## 2019-03-26 DIAGNOSIS — R06.02 SOB (SHORTNESS OF BREATH) ON EXERTION: Primary | ICD-10-CM

## 2019-03-26 LAB
ALBUMIN SERPL-MCNC: 4.1 G/DL (ref 3.5–4.6)
ALP BLD-CCNC: 62 U/L (ref 35–104)
ALT SERPL-CCNC: 16 U/L (ref 0–41)
ANION GAP SERPL CALCULATED.3IONS-SCNC: 17 MEQ/L (ref 9–15)
AST SERPL-CCNC: 20 U/L (ref 0–40)
BASOPHILS ABSOLUTE: 0 K/UL (ref 0–0.2)
BASOPHILS RELATIVE PERCENT: 0.8 %
BILIRUB SERPL-MCNC: 0.5 MG/DL (ref 0.2–0.7)
BUN BLDV-MCNC: 28 MG/DL (ref 8–23)
CALCIUM SERPL-MCNC: 9 MG/DL (ref 8.5–9.9)
CHLORIDE BLD-SCNC: 103 MEQ/L (ref 95–107)
CO2: 18 MEQ/L (ref 20–31)
CREAT SERPL-MCNC: 1.46 MG/DL (ref 0.7–1.2)
EOSINOPHILS ABSOLUTE: 0 K/UL (ref 0–0.7)
EOSINOPHILS RELATIVE PERCENT: 0.5 %
FOLATE: 16 NG/ML (ref 7.3–26.1)
GFR AFRICAN AMERICAN: 56
GFR NON-AFRICAN AMERICAN: 46.3
GLOBULIN: 2.8 G/DL (ref 2.3–3.5)
GLUCOSE BLD-MCNC: 140 MG/DL (ref 70–99)
HCT VFR BLD CALC: 29.9 % (ref 42–52)
HEMOGLOBIN: 9.8 G/DL (ref 14–18)
IRON SATURATION: 15 % (ref 11–46)
IRON: 42 UG/DL (ref 59–158)
LYMPHOCYTES ABSOLUTE: 0.7 K/UL (ref 1–4.8)
LYMPHOCYTES RELATIVE PERCENT: 14.7 %
MCH RBC QN AUTO: 30.9 PG (ref 27–31.3)
MCHC RBC AUTO-ENTMCNC: 32.7 % (ref 33–37)
MCV RBC AUTO: 94.7 FL (ref 80–100)
MONOCYTES ABSOLUTE: 0.4 K/UL (ref 0.2–0.8)
MONOCYTES RELATIVE PERCENT: 9.5 %
NEUTROPHILS ABSOLUTE: 3.5 K/UL (ref 1.4–6.5)
NEUTROPHILS RELATIVE PERCENT: 74.5 %
PDW BLD-RTO: 16 % (ref 11.5–14.5)
PLATELET # BLD: 151 K/UL (ref 130–400)
POTASSIUM SERPL-SCNC: 4.9 MEQ/L (ref 3.4–4.9)
RBC # BLD: 3.16 M/UL (ref 4.7–6.1)
SODIUM BLD-SCNC: 138 MEQ/L (ref 135–144)
TOTAL IRON BINDING CAPACITY: 283 UG/DL (ref 178–450)
TOTAL PROTEIN: 6.9 G/DL (ref 6.3–8)
VITAMIN B-12: 313 PG/ML (ref 232–1245)
WBC # BLD: 4.7 K/UL (ref 4.8–10.8)

## 2019-03-26 PROCEDURE — 99214 OFFICE O/P EST MOD 30 MIN: CPT | Performed by: NURSE PRACTITIONER

## 2019-03-26 RX ORDER — PREDNISONE 10 MG/1
TABLET ORAL
Qty: 18 TABLET | Refills: 0 | Status: SHIPPED | OUTPATIENT
Start: 2019-03-26 | End: 2019-04-04

## 2019-03-26 RX ORDER — ALBUTEROL SULFATE 90 UG/1
2 AEROSOL, METERED RESPIRATORY (INHALATION) EVERY 6 HOURS PRN
Qty: 1 INHALER | Refills: 0 | Status: ON HOLD | OUTPATIENT
Start: 2019-03-26 | End: 2020-12-01

## 2019-03-26 RX ORDER — AZITHROMYCIN 250 MG/1
TABLET, FILM COATED ORAL
Qty: 1 PACKET | Refills: 0 | Status: SHIPPED | OUTPATIENT
Start: 2019-03-26 | End: 2019-07-23 | Stop reason: ALTCHOICE

## 2019-03-26 RX ORDER — HYDROCODONE BITARTRATE AND ACETAMINOPHEN 5; 325 MG/1; MG/1
1 TABLET ORAL EVERY 6 HOURS PRN
Qty: 28 TABLET | Refills: 0 | Status: SHIPPED | OUTPATIENT
Start: 2019-03-26 | End: 2019-04-02

## 2019-03-26 ASSESSMENT — ENCOUNTER SYMPTOMS
VOMITING: 0
RECTAL PAIN: 0
ANAL BLEEDING: 0
CONSTIPATION: 0
ABDOMINAL PAIN: 1
BLOOD IN STOOL: 0
DIARRHEA: 0
SHORTNESS OF BREATH: 1
NAUSEA: 0
WHEEZING: 0
ABDOMINAL DISTENTION: 0
COUGH: 1

## 2019-03-29 ENCOUNTER — OFFICE VISIT (OUTPATIENT)
Dept: PRIMARY CARE CLINIC | Age: 84
End: 2019-03-29
Payer: COMMERCIAL

## 2019-03-29 VITALS
OXYGEN SATURATION: 99 % | TEMPERATURE: 97.5 F | HEART RATE: 64 BPM | BODY MASS INDEX: 27.31 KG/M2 | WEIGHT: 231.3 LBS | DIASTOLIC BLOOD PRESSURE: 64 MMHG | HEIGHT: 77 IN | SYSTOLIC BLOOD PRESSURE: 116 MMHG

## 2019-03-29 DIAGNOSIS — R06.09 DOE (DYSPNEA ON EXERTION): ICD-10-CM

## 2019-03-29 DIAGNOSIS — I50.9 CONGESTIVE HEART FAILURE, UNSPECIFIED HF CHRONICITY, UNSPECIFIED HEART FAILURE TYPE (HCC): Primary | ICD-10-CM

## 2019-03-29 DIAGNOSIS — N40.0 BENIGN NON-NODULAR PROSTATIC HYPERPLASIA: ICD-10-CM

## 2019-03-29 DIAGNOSIS — F41.9 ANXIETY: ICD-10-CM

## 2019-03-29 PROCEDURE — 99214 OFFICE O/P EST MOD 30 MIN: CPT | Performed by: NURSE PRACTITIONER

## 2019-03-29 RX ORDER — LORAZEPAM 0.5 MG/1
0.5 TABLET ORAL 2 TIMES DAILY PRN
Qty: 20 TABLET | Refills: 0 | Status: SHIPPED | OUTPATIENT
Start: 2019-03-29 | End: 2019-04-08

## 2019-03-29 RX ORDER — TAMSULOSIN HYDROCHLORIDE 0.4 MG/1
0.4 CAPSULE ORAL NIGHTLY
Qty: 90 CAPSULE | Refills: 0 | Status: SHIPPED | OUTPATIENT
Start: 2019-03-29

## 2019-03-29 RX ORDER — LOSARTAN POTASSIUM 25 MG/1
25 TABLET ORAL DAILY
Qty: 30 TABLET | Refills: 3 | Status: SHIPPED | OUTPATIENT
Start: 2019-03-29 | End: 2019-07-23 | Stop reason: DRUGHIGH

## 2019-03-29 ASSESSMENT — ENCOUNTER SYMPTOMS
WHEEZING: 0
COUGH: 0
SHORTNESS OF BREATH: 1

## 2019-04-01 ENCOUNTER — TELEPHONE (OUTPATIENT)
Dept: FAMILY MEDICINE CLINIC | Age: 84
End: 2019-04-01

## 2019-04-01 NOTE — TELEPHONE ENCOUNTER
Patient was prescribed Losartan last Friday, 03-29-19. After taking Losartan patient experienced, dizziness, shortness of breathe and malaise. What to do? Please advise. Patient's phone # [phone].

## 2019-04-01 NOTE — TELEPHONE ENCOUNTER
Please have pt stop taking losartan. Does he have a follow up scheduled with DR. Libia Blanco yet?  Keep follow up with Dr. Malou Hollis

## 2019-04-04 ENCOUNTER — OFFICE VISIT (OUTPATIENT)
Dept: CARDIOLOGY CLINIC | Age: 84
End: 2019-04-04
Payer: COMMERCIAL

## 2019-04-04 VITALS
DIASTOLIC BLOOD PRESSURE: 78 MMHG | OXYGEN SATURATION: 96 % | SYSTOLIC BLOOD PRESSURE: 120 MMHG | WEIGHT: 223.6 LBS | HEIGHT: 77 IN | BODY MASS INDEX: 26.4 KG/M2 | HEART RATE: 67 BPM | RESPIRATION RATE: 22 BRPM

## 2019-04-04 DIAGNOSIS — I34.0 MITRAL VALVE INSUFFICIENCY, UNSPECIFIED ETIOLOGY: ICD-10-CM

## 2019-04-04 DIAGNOSIS — N18.9 CHRONIC KIDNEY DISEASE, UNSPECIFIED CKD STAGE: ICD-10-CM

## 2019-04-04 DIAGNOSIS — I50.42 CHRONIC COMBINED SYSTOLIC AND DIASTOLIC CONGESTIVE HEART FAILURE (HCC): ICD-10-CM

## 2019-04-04 DIAGNOSIS — I65.22 LEFT CAROTID ARTERY STENOSIS: ICD-10-CM

## 2019-04-04 DIAGNOSIS — I48.91 ATRIAL FIBRILLATION, UNSPECIFIED TYPE (HCC): Primary | ICD-10-CM

## 2019-04-04 PROCEDURE — 99214 OFFICE O/P EST MOD 30 MIN: CPT | Performed by: INTERNAL MEDICINE

## 2019-04-04 RX ORDER — TAMSULOSIN HYDROCHLORIDE 0.4 MG/1
CAPSULE ORAL
Qty: 90 CAPSULE | Refills: 0 | OUTPATIENT
Start: 2019-04-04

## 2019-04-04 ASSESSMENT — ENCOUNTER SYMPTOMS
NAUSEA: 0
APNEA: 0
DIARRHEA: 0
FACIAL SWELLING: 0
VOICE CHANGE: 0
SHORTNESS OF BREATH: 1
WHEEZING: 0
BLOOD IN STOOL: 0
ANAL BLEEDING: 0
COLOR CHANGE: 0
CHEST TIGHTNESS: 0
VOMITING: 0
TROUBLE SWALLOWING: 0
ABDOMINAL DISTENTION: 0

## 2019-04-04 NOTE — PROGRESS NOTES
Relationship status: None    Intimate partner violence:     Fear of current or ex partner: None     Emotionally abused: None     Physically abused: None     Forced sexual activity: None   Other Topics Concern    None   Social History Narrative    None       No Known Allergies    Current Outpatient Medications   Medication Sig Dispense Refill    LORazepam (ATIVAN) 0.5 MG tablet Take 1 tablet by mouth 2 times daily as needed for Anxiety for up to 10 days. 20 tablet 0    tamsulosin (FLOMAX) 0.4 MG capsule Take 1 capsule by mouth nightly 90 capsule 0    azithromycin (ZITHROMAX) 250 MG tablet Take 2 tabs (500 mg) on Day 1, and take 1 tab (250 mg) on days 2 through 5. 1 packet 0    predniSONE (DELTASONE) 10 MG tablet Take 3 tabs po for 3 days, take 2 tabs po for 3 days, take 1 tab po for 3 days 18 tablet 0    albuterol sulfate  (90 Base) MCG/ACT inhaler Inhale 2 puffs into the lungs every 6 hours as needed for Wheezing 1 Inhaler 0    atorvastatin (LIPITOR) 10 MG tablet TAKE ONE TABLET BY MOUTH EVERY DAY 90 tablet 0    rivaroxaban (XARELTO) 15 MG TABS tablet Take 1 tablet by mouth daily (with breakfast) 90 tablet 3    esomeprazole (NEXIUM) 40 MG delayed release capsule TAKE ONE CAPSULE BY MOUTH EVERY MORNING BEFORE BREAKFAST 90 capsule 1    furosemide (LASIX) 20 MG tablet Take 1 tablet by mouth daily as needed (swelling) 30 tablet 11    finasteride (PROSCAR) 5 MG tablet TAKE ONE TABLET BY MOUTH EVERY DAY 90 tablet 1    metoprolol succinate (TOPROL XL) 50 MG extended release tablet TAKE ONE TABLET BY MOUTH EVERY DAY 90 tablet 1    metFORMIN (GLUCOPHAGE) 1000 MG tablet TAKE ONE TABLET BY MOUTH TWO TIMES A DAY FOR DIABETES 180 tablet 2    losartan (COZAAR) 25 MG tablet Take 1 tablet by mouth daily 30 tablet 3     No current facility-administered medications for this visit. Review of Systems:   Review of Systems   Constitutional: Positive for fatigue.  Negative for activity change, appetite change, diaphoresis and unexpected weight change. HENT: Negative for facial swelling, nosebleeds, trouble swallowing and voice change. Respiratory: Positive for shortness of breath. Negative for apnea, chest tightness and wheezing. Cardiovascular: Negative for chest pain, palpitations and leg swelling. Gastrointestinal: Negative for abdominal distention, anal bleeding, blood in stool, diarrhea, nausea and vomiting. Genitourinary: Negative for decreased urine volume and dysuria. Musculoskeletal: Negative for gait problem, myalgias, neck pain and neck stiffness. Skin: Negative for color change, pallor, rash and wound. Neurological: Negative for dizziness, seizures, syncope, facial asymmetry, weakness, light-headedness, numbness and headaches. Hematological: Does not bruise/bleed easily. Psychiatric/Behavioral: Negative for agitation, behavioral problems, confusion, hallucinations and suicidal ideas. The patient is not nervous/anxious. All other systems reviewed and are negative. Review of System is negative except for as mentioned above. Physical Examination:    /78 (Site: Left Upper Arm, Position: Sitting, Cuff Size: Large Adult)   Pulse 67   Resp 22   Ht 6' 5\" (1.956 m)   Wt 223 lb 9.6 oz (101.4 kg)   SpO2 96%   BMI 26.52 kg/m²    Physical Exam   Constitutional: He appears healthy. No distress. HENT:   Nose: Nose normal.   Mouth/Throat: Dentition is normal. Oropharynx is clear. Eyes: Pupils are equal, round, and reactive to light. Conjunctivae are normal.   Neck: Normal range of motion and thyroid normal. Neck supple. Cardiovascular: Regular rhythm, S1 normal, S2 normal, intact distal pulses and normal pulses. PMI is displaced. Exam reveals gallop and S3.   Murmur heard. Systolic murmur is present. Pulmonary/Chest: He has no wheezes. He has no rales. He exhibits no tenderness. Abdominal: Soft. Bowel sounds are normal. He exhibits no distension and no mass. There is no splenomegaly or hepatomegaly. There is no tenderness. No hernia. Neurological: He is alert and oriented to person, place, and time. He has normal motor skills. Gait normal.   Skin: Skin is warm and dry. No cyanosis. No jaundice. Nails show no clubbing.        LABS:  CBC:   Lab Results   Component Value Date    WBC 4.7 03/26/2019    RBC 3.16 03/26/2019    HGB 9.8 03/26/2019    HCT 29.9 03/26/2019    MCV 94.7 03/26/2019    MCH 30.9 03/26/2019    MCHC 32.7 03/26/2019    RDW 16.0 03/26/2019     03/26/2019    MPV 11.2 03/23/2015     Lipids:  Lab Results   Component Value Date    CHOL 129 12/28/2018    CHOL 165 07/20/2017    CHOL 170 03/30/2016     Lab Results   Component Value Date    TRIG 70 12/28/2018    TRIG 102 07/20/2017    TRIG 78 03/30/2016     Lab Results   Component Value Date    HDL 51 12/28/2018    HDL 53 07/20/2017    HDL 59 03/30/2016     Lab Results   Component Value Date    LDLCALC 64 12/28/2018    LDLCALC 92 07/20/2017    LDLCALC 95 03/30/2016     Lab Results   Component Value Date    VLDL 25 03/24/2014     Lab Results   Component Value Date    CHOLHDLRATIO 3.5 02/25/2013    CHOLHDLRATIO 3.5 11/15/2012    CHOLHDLRATIO 5.3 07/12/2012     CMP:    Lab Results   Component Value Date     03/26/2019    K 4.9 03/26/2019     03/26/2019    CO2 18 03/26/2019    BUN 28 03/26/2019    CREATININE 1.46 03/26/2019    GFRAA 56.0 03/26/2019    LABGLOM 46.3 03/26/2019    GLUCOSE 140 03/26/2019    GLUCOSE 115 02/27/2012    PROT 6.9 03/26/2019    LABALBU 4.1 03/26/2019    LABALBU 4.4 02/27/2012    CALCIUM 9.0 03/26/2019    BILITOT 0.5 03/26/2019    ALKPHOS 62 03/26/2019    AST 20 03/26/2019    ALT 16 03/26/2019     BMP:    Lab Results   Component Value Date     03/26/2019    K 4.9 03/26/2019     03/26/2019    CO2 18 03/26/2019    BUN 28 03/26/2019    LABALBU 4.1 03/26/2019    LABALBU 4.4 02/27/2012    CREATININE 1.46 03/26/2019    CALCIUM 9.0 03/26/2019    GFRAA 56.0 03/26/2019 LABGLOM 46.3 03/26/2019    GLUCOSE 140 03/26/2019    GLUCOSE 115 02/27/2012     Magnesium:  No results found for: MG  TSH:No results found for: TSHFT4, TSH    Patient Active Problem List   Diagnosis    Smoking hx    Hypercholesterolemia    Low back pain    Constipation x 2weeks    OA (osteoarthritis of spine)    Tinea manuum    Abdominal wall pain in right lower quadrant    Postlaminectomy syndrome, lumbar    Abnormality of gait    Prostate cancer ([address]    VT (ventricular tachycardia) ([address]    Left carotid artery stenosis    Pre-diabetes    After-cataract, obscuring vision    Atherosclerosis of native coronary artery of native heart without angina pectoris    Abdominal wall bulge    SOB (shortness of breath)    Combined systolic and diastolic congestive heart failure (HCC)    Chronic stable angina (HCC)    Elevated troponin    Retinal embolus, right eye    Swollen abdomen    Trigger finger, acquired       There are no discontinued medications. Modified Medications    No medications on file       No orders of the defined types were placed in this encounter. Assessment:    1. Atrial fibrillation, unspecified type ([address]    2. Chronic combined systolic and diastolic congestive heart failure ([address]    3. Left carotid artery stenosis    4. Mitral valve insufficiency, unspecified etiology    5. Chronic kidney disease, unspecified CKD stage       Plan:   Stay on same medications. See me in 2 months. This note was partially generated using Dragon voice recognition system, and there may be some incorrect words, spellings, punctuation that were not noticed in checking the note before saving.         Electronically signed by Madisyn Gonzalez MD on 4/5/2019 at 3:31 PM

## 2019-04-05 NOTE — TELEPHONE ENCOUNTER
Pt called back, states he still hasn't been able to schedule with Dr. Gayle Martinez, he did see Dr. Nelly Sommers, who told him to stay off of the losartan.

## 2019-04-08 ENCOUNTER — TELEPHONE (OUTPATIENT)
Dept: CARDIOLOGY CLINIC | Age: 84
End: 2019-04-08

## 2019-04-16 DIAGNOSIS — I10 HTN (HYPERTENSION), BENIGN: ICD-10-CM

## 2019-04-16 NOTE — TELEPHONE ENCOUNTER
PT STATES DR ROBERTS INCREASED METOPROLOL AND WATER PILL AND NEEDS NEW RX SENT TO PHARMACY WITH NEW INSTRUCTIONS

## 2019-04-19 RX ORDER — METOPROLOL SUCCINATE 50 MG/1
50 TABLET, EXTENDED RELEASE ORAL 2 TIMES DAILY
Qty: 180 TABLET | Refills: 3 | Status: ON HOLD | OUTPATIENT
Start: 2019-04-19 | End: 2020-12-01

## 2019-04-19 RX ORDER — FUROSEMIDE 20 MG/1
20 TABLET ORAL 2 TIMES DAILY
Qty: 60 TABLET | Refills: 5 | Status: SHIPPED | OUTPATIENT
Start: 2019-04-19 | End: 2019-07-23 | Stop reason: DRUGHIGH

## 2019-04-19 NOTE — TELEPHONE ENCOUNTER
PT NEEDS RX FOR NEW DOSE OF LASIX SENT TO PHARMACY. INCREASED FROM 20 MG DAILY TO 40 MG DAILY. NEW RX SENT.

## 2019-06-24 ENCOUNTER — OFFICE VISIT (OUTPATIENT)
Dept: CARDIOLOGY CLINIC | Age: 84
End: 2019-06-24
Payer: COMMERCIAL

## 2019-06-24 VITALS
HEART RATE: 60 BPM | SYSTOLIC BLOOD PRESSURE: 122 MMHG | RESPIRATION RATE: 22 BRPM | DIASTOLIC BLOOD PRESSURE: 86 MMHG | WEIGHT: 213 LBS | OXYGEN SATURATION: 98 % | HEIGHT: 77 IN | BODY MASS INDEX: 25.15 KG/M2

## 2019-06-24 DIAGNOSIS — Z98.61 HISTORY OF PTCA: ICD-10-CM

## 2019-06-24 DIAGNOSIS — I47.20 VT (VENTRICULAR TACHYCARDIA): ICD-10-CM

## 2019-06-24 DIAGNOSIS — I50.42 CHRONIC COMBINED SYSTOLIC AND DIASTOLIC CONGESTIVE HEART FAILURE (HCC): ICD-10-CM

## 2019-06-24 DIAGNOSIS — I25.10 LEFT MAIN CORONARY ARTERY DISEASE: ICD-10-CM

## 2019-06-24 DIAGNOSIS — I34.0 MITRAL VALVE INSUFFICIENCY, UNSPECIFIED ETIOLOGY: ICD-10-CM

## 2019-06-24 DIAGNOSIS — I48.91 ATRIAL FIBRILLATION, UNSPECIFIED TYPE (HCC): ICD-10-CM

## 2019-06-24 DIAGNOSIS — N18.9 CHRONIC KIDNEY DISEASE, UNSPECIFIED CKD STAGE: ICD-10-CM

## 2019-06-24 DIAGNOSIS — Z09 HOSPITAL DISCHARGE FOLLOW-UP: Primary | ICD-10-CM

## 2019-06-24 PROBLEM — Z98.62 S/P PERIPHERAL ARTERY ANGIOPLASTY: Status: ACTIVE | Noted: 2019-06-24

## 2019-06-24 PROCEDURE — 99215 OFFICE O/P EST HI 40 MIN: CPT | Performed by: INTERNAL MEDICINE

## 2019-06-24 RX ORDER — LANOLIN ALCOHOL/MO/W.PET/CERES
1000 CREAM (GRAM) TOPICAL DAILY
COMMUNITY

## 2019-06-24 RX ORDER — FERROUS SULFATE 325(65) MG
325 TABLET ORAL
COMMUNITY

## 2019-06-24 ASSESSMENT — ENCOUNTER SYMPTOMS
APNEA: 0
ANAL BLEEDING: 0
NAUSEA: 0
VOICE CHANGE: 0
TROUBLE SWALLOWING: 0
BLOOD IN STOOL: 0
WHEEZING: 0
DIARRHEA: 0
FACIAL SWELLING: 0
CHEST TIGHTNESS: 0
COLOR CHANGE: 0
ABDOMINAL DISTENTION: 0
VOMITING: 0
SHORTNESS OF BREATH: 0

## 2019-06-24 NOTE — PROGRESS NOTES
[facility] CARDIOLOGY OFFICE FOLLOW-UP      Patient: Keiko Samaniego  YOB: 1935  MRN: [id number]    Chief Complaint:  Chief Complaint   Patient presents with    Follow-Up from [place] [id number]         Subjective/HPI:  6/24/19: Patient presents today for follow-up of recent hospitalization at [facility] with atrial fibrillation acute congestive heart failure. Underwent a cardiac catheterization which showed significant left main disease. He was then transferred to [facility] to Dr Brittany Meng. After a PET scan was done, he underwent angioplasty of the left main and ostial circumflex. LAD was okay. He feels better. Atrial fibrillation is much better controlled. I suspect significant hibernating myocardium which he will recover. He has atrial fibrillation for which she was initially seen by me and on Xarelto 15 mg. Has mild to moderately impaired renal function. He is on Plavix now. Is significantly improved. He will join rehab on 83. See me in 2 months     4/4/19: Patient presents today for follow-up of congestive heart failure. Echo confirms cardiomyopathy. Ejection fraction 25%. Moderate to severe mitral regurgitation. Increase the dose of Toprol to 50 twice a day. Dr. Antwan Chandra started him on losartan which she didn't tolerate. He stopped taking it. He had a CAT scan of the chest which showed severe loss of the pneumonia. He is on Lasix 20 mg a day. Not very active. I told him to increase his activity somewhat. He will need a cardiac catheterization but withhold any procedures at this point until the renal function resumes back to normal. I may send him downtown [id number] [facility] for complete workup including cardiac catheterization and see Marda Schirmer for MR     3/14/19: Patient presents today for for follow-up of congestive heart failure. He has tolerated Xarelto okay. No bleeding. His wife is on Xarelto because of history of PE.  The leg swelling is better. Atrial  Fibrillation with the controlled ventricular rate possibly has sick sinus syndrome. I'm waiting for the echo to be received and then decide about cardioversion. In the meantime take Xarelto 15, day. A new prescription was sent     2/21/19: Patient presents today for Evaluation of shortness of breath. Very pleasant 72-year-old white male. Consulted by Dr. Gayle Martinez. [address]. Retired [employer]. 5 children. One daughter lives in [place] wants to switch to Xarelto. We will given supplies of 20 mg of Xarelto. And call in a prescription for 50 mg a day. Also started on Lasix 20 mg a day. Last potassium was 5. GFR is 55. Chest x-ray showed right pleural effusion.         Past Medical History:   Diagnosis Date    Combined systolic and diastolic congestive heart failure ([address] 2/21/2019    Constipation x 2weeks 6/18/2014    Diverticulosis of sigmoid colon 02/03/2016    DR. MANZO    Hypercholesterolemia     LBP (low back pain) 6/18/2014    LBP (low back pain) 6/18/2014    OA (osteoarthritis)     Polyp, colonic 02/03/2016    DR. MANZO    Smoking hx     SOB (shortness of breath) 2/20/2019    Tinea manuum 7/22/2015    Type II or unspecified type diabetes mellitus without mention of complication, not stated as uncontrolled        Past Surgical History:   Procedure Laterality Date    CAROTID ENDARTERECTOMY  8/2/12    CATARACT REMOVAL WITH IMPLANT  9/9/13    [facility] DR. GRIDER     COLONOSCOPY  10/18/2012    [facility]    UPPER GASTROINTESTINAL ENDOSCOPY  10/31/2017    DR ANAIS Malin Iain WISDOM TOOTH EXTRACTION  1950       Family History   Problem Relation Age of Onset    Diabetes Mother     Heart Disease Mother     Allergy (Severe) Mother        Social History     Socioeconomic History    Marital status: Single     Spouse name: None    Number of children: None    Years of education: None    Highest education level: None   Occupational History    None   Social Needs    Financial resource strain: None    Food insecurity:     Worry: None     Inability: None    Transportation needs:     Medical: None     Non-medical: None   Tobacco Use    Smoking status: Former Smoker     Packs/day: 0.50     Types: Cigarettes    Smokeless tobacco: Never Used   Substance and Sexual Activity    Alcohol use:  Yes    Drug use: No    Sexual activity: Yes     Partners: Female   Lifestyle    Physical activity:     Days per week: None     Minutes per session: None    Stress: None   Relationships    Social connections:     Talks on phone: None     Gets together: None     Attends Yarsani service: None     Active member of club or organization: None     Attends meetings of clubs or organizations: None     Relationship status: None    Intimate partner violence:     Fear of current or ex partner: None     Emotionally abused: None     Physically abused: None     Forced sexual activity: None   Other Topics Concern    None   Social History Narrative    None       No Known Allergies    Current Outpatient Medications   Medication Sig Dispense Refill    ferrous sulfate 325 (65 Fe) MG tablet Take 325 mg by mouth      vitamin B-12 (CYANOCOBALAMIN) 1000 MCG tablet Take 1,000 mcg by mouth      metoprolol succinate (TOPROL XL) 50 MG extended release tablet Take 1 tablet by mouth 2 times daily 180 tablet 3    furosemide (LASIX) 20 MG tablet Take 1 tablet by mouth 2 times daily 60 tablet 5    losartan (COZAAR) 25 MG tablet Take 1 tablet by mouth daily 30 tablet 3    tamsulosin (FLOMAX) 0.4 MG capsule Take 1 capsule by mouth nightly 90 capsule 0    azithromycin (ZITHROMAX) 250 MG tablet Take 2 tabs (500 mg) on Day 1, and take 1 tab (250 mg) on days 2 through 5. 1 packet 0    albuterol sulfate  (90 Base) MCG/ACT inhaler Inhale 2 puffs into the lungs every 6 hours as needed for Wheezing 1 Inhaler 0    atorvastatin (LIPITOR) 10 MG tablet TAKE ONE TABLET BY MOUTH EVERY DAY 90 tablet 0    rivaroxaban (XARELTO) 15 MG TABS tablet Take 1 tablet by mouth daily (with breakfast) 90 tablet 3    esomeprazole (NEXIUM) 40 MG delayed release capsule TAKE ONE CAPSULE BY MOUTH EVERY MORNING BEFORE BREAKFAST 90 capsule 1    finasteride (PROSCAR) 5 MG tablet TAKE ONE TABLET BY MOUTH EVERY DAY 90 tablet 1    metFORMIN (GLUCOPHAGE) 1000 MG tablet TAKE ONE TABLET BY MOUTH TWO TIMES A DAY FOR DIABETES 180 tablet 2     No current facility-administered medications for this visit. Review of Systems:   Review of Systems   Constitutional: Negative for activity change, appetite change, diaphoresis, fatigue and unexpected weight change. HENT: Negative for facial swelling, nosebleeds, trouble swallowing and voice change. Respiratory: Negative for apnea, chest tightness, shortness of breath and wheezing. Cardiovascular: Negative for chest pain, palpitations and leg swelling. Gastrointestinal: Negative for abdominal distention, anal bleeding, blood in stool, diarrhea, nausea and vomiting. Genitourinary: Negative for decreased urine volume and dysuria. Musculoskeletal: Negative for gait problem, myalgias, neck pain and neck stiffness. Skin: Negative for color change, pallor, rash and wound. Neurological: Negative for dizziness, seizures, syncope, facial asymmetry, weakness, light-headedness, numbness and headaches. Hematological: Does not bruise/bleed easily. Psychiatric/Behavioral: Negative for agitation, behavioral problems, confusion, hallucinations and suicidal ideas. The patient is not nervous/anxious. All other systems reviewed and are negative. Review of System is negative except for as mentioned above. Physical Examination:    /86 (Site: Left Upper Arm, Position: Sitting, Cuff Size: Medium Adult)   Pulse 60   Resp 22   Ht 6' 5\" (1.956 m)   Wt 213 lb (96.6 kg)   SpO2 98%   BMI 25.26 kg/m²    Physical Exam   Cardiovascular: An irregularly irregular rhythm present. Patient Active Problem List   Diagnosis    Smoking hx    Hypercholesterolemia    Low back pain    Constipation x 2weeks    OA (osteoarthritis of spine)    Tinea manuum    Abdominal wall pain in right lower quadrant    Postlaminectomy syndrome, lumbar    Abnormality of gait    Prostate cancer ([address]    VT (ventricular tachycardia) ([address]    Left carotid artery stenosis    Pre-diabetes    After-cataract, obscuring vision    Atherosclerosis of native coronary artery of native heart without angina pectoris    Abdominal wall bulge    SOB (shortness of breath)    Combined systolic and diastolic congestive heart failure (HCC)    Chronic stable angina (HCC)    Retinal embolus, right eye    Swollen abdomen    Trigger finger, acquired    S/P peripheral artery angioplasty           No orders of the defined types were placed in this encounter. No orders of the defined types were placed in this encounter. Assessment:    1. S/P peripheral artery angioplasty    2. Hospital discharge follow-up       Plan:   Stay on same medications. See me in 2 months. This note was partially generated using Dragon voice recognition system, and there may be some incorrect words, spellings, punctuation that were not noticed in checking the note before saving.         Electronically signed by Imelda Martel MD on 6/24/2019 at 3:49 PM

## 2019-06-25 ENCOUNTER — TELEPHONE (OUTPATIENT)
Dept: CARDIOLOGY CLINIC | Age: 84
End: 2019-06-25

## 2019-06-25 DIAGNOSIS — E78.00 HYPERCHOLESTEROLEMIA: ICD-10-CM

## 2019-06-25 NOTE — TELEPHONE ENCOUNTER
PT CALLING. STATES HE HAD STENTS PUT IN LAST Tuesday. HIS LIPITOR WAS INCREASED FROM 10 MG DAILY TO 80 MG DAILY. STATES HE HAS BEEN HAVING DIARRHEA SINCE. HE THINKS IT IS THE INCREASE IN MEDICATION. PLEASE ADVISE.

## 2019-06-26 RX ORDER — ATORVASTATIN CALCIUM 10 MG/1
TABLET, FILM COATED ORAL
Qty: 90 TABLET | Refills: 3 | Status: SHIPPED | OUTPATIENT
Start: 2019-06-26

## 2019-07-16 ENCOUNTER — OFFICE VISIT (OUTPATIENT)
Dept: CARDIOLOGY CLINIC | Age: 84
End: 2019-07-16
Payer: COMMERCIAL

## 2019-07-16 VITALS
SYSTOLIC BLOOD PRESSURE: 124 MMHG | BODY MASS INDEX: 25.86 KG/M2 | HEIGHT: 77 IN | RESPIRATION RATE: 20 BRPM | WEIGHT: 219 LBS | DIASTOLIC BLOOD PRESSURE: 82 MMHG | HEART RATE: 76 BPM | OXYGEN SATURATION: 99 %

## 2019-07-16 DIAGNOSIS — I50.42 CHRONIC COMBINED SYSTOLIC AND DIASTOLIC CONGESTIVE HEART FAILURE (HCC): Primary | ICD-10-CM

## 2019-07-16 DIAGNOSIS — N18.9 CHRONIC KIDNEY DISEASE, UNSPECIFIED CKD STAGE: ICD-10-CM

## 2019-07-16 DIAGNOSIS — Z87.891 SMOKING HX: ICD-10-CM

## 2019-07-16 DIAGNOSIS — I47.20 VT (VENTRICULAR TACHYCARDIA): ICD-10-CM

## 2019-07-16 DIAGNOSIS — Z98.62 S/P PERIPHERAL ARTERY ANGIOPLASTY: ICD-10-CM

## 2019-07-16 DIAGNOSIS — I48.91 ATRIAL FIBRILLATION, UNSPECIFIED TYPE (HCC): ICD-10-CM

## 2019-07-16 PROCEDURE — 99214 OFFICE O/P EST MOD 30 MIN: CPT | Performed by: INTERNAL MEDICINE

## 2019-07-16 ASSESSMENT — ENCOUNTER SYMPTOMS
DIARRHEA: 0
VOMITING: 0
COLOR CHANGE: 0
NAUSEA: 0
FACIAL SWELLING: 0
WHEEZING: 0
ANAL BLEEDING: 0
CHEST TIGHTNESS: 0
ABDOMINAL DISTENTION: 0
SHORTNESS OF BREATH: 1
APNEA: 0
TROUBLE SWALLOWING: 0
BLOOD IN STOOL: 0
VOICE CHANGE: 0

## 2019-07-16 NOTE — PROGRESS NOTES
9/9/13    [facility] DR. GRIDER     COLONOSCOPY  10/18/2012    [facility]  [facility]/3/16    [facility]    UPPER GASTROINTESTINAL ENDOSCOPY  10/31/2017    DR MANZO   [facility] WISDOM TOOTH EXTRACTION  1950       Family History   Problem Relation Age of Onset    Diabetes Mother     Heart Disease Mother     Allergy (Severe) Mother        Social History     Socioeconomic History    Marital status: Single     Spouse name: None    Number of children: None    Years of education: None    Highest education level: None   Occupational History    None   Social Needs    Financial resource strain: None    Food insecurity:     Worry: None     Inability: None    Transportation needs:     Medical: None     Non-medical: None   Tobacco Use    Smoking status: Former Smoker     Packs/day: 0.50     Types: Cigarettes    Smokeless tobacco: Never Used   Substance and Sexual Activity    Alcohol use:  Yes    Drug use: No    Sexual activity: Yes     Partners: Female   Lifestyle    Physical activity:     Days per week: None     Minutes per session: None    Stress: None   Relationships    Social connections:     Talks on phone: None     Gets together: None     Attends Taoist service: None     Active member of club or organization: None     Attends meetings of clubs or organizations: None     Relationship status: None    Intimate partner violence:     Fear of current or ex partner: None     Emotionally abused: None     Physically abused: None     Forced sexual activity: None   Other Topics Concern    None   Social History Narrative    None       No Known Allergies    Current Outpatient Medications   Medication Sig Dispense Refill    atorvastatin (LIPITOR) 10 MG tablet TAKE ONE TABLET BY MOUTH EVERY DAY 90 tablet 3    ferrous sulfate 325 (65 Fe) MG tablet Take 325 mg by mouth      vitamin B-12 (CYANOCOBALAMIN) 1000 MCG tablet Take 1,000 mcg by mouth      metoprolol succinate (TOPROL XL) 50 MG extended release tablet Take 1 tablet by mouth 2

## 2019-07-19 ENCOUNTER — HOSPITAL ENCOUNTER (OUTPATIENT)
Dept: NON INVASIVE DIAGNOSTICS | Age: 84
Discharge: HOME | End: 2019-07-19
Payer: COMMERCIAL

## 2019-07-19 DIAGNOSIS — Z98.62 S/P PERIPHERAL ARTERY ANGIOPLASTY: ICD-10-CM

## 2019-07-19 DIAGNOSIS — I47.20 VT (VENTRICULAR TACHYCARDIA): ICD-10-CM

## 2019-07-19 DIAGNOSIS — Z87.891 SMOKING HX: ICD-10-CM

## 2019-07-19 DIAGNOSIS — I50.42 CHRONIC COMBINED SYSTOLIC AND DIASTOLIC CONGESTIVE HEART FAILURE (HCC): ICD-10-CM

## 2019-07-19 LAB
LV EF: 20 %
LVEF MODALITY: NORMAL

## 2019-07-19 PROCEDURE — 93306 TTE W/DOPPLER COMPLETE: CPT

## 2019-07-23 ENCOUNTER — OFFICE VISIT (OUTPATIENT)
Dept: CARDIOLOGY CLINIC | Age: 84
End: 2019-07-23
Payer: COMMERCIAL

## 2019-07-23 VITALS
DIASTOLIC BLOOD PRESSURE: 84 MMHG | HEART RATE: 74 BPM | WEIGHT: 218 LBS | OXYGEN SATURATION: 96 % | RESPIRATION RATE: 22 BRPM | HEIGHT: 77 IN | BODY MASS INDEX: 25.74 KG/M2 | SYSTOLIC BLOOD PRESSURE: 126 MMHG

## 2019-07-23 DIAGNOSIS — N18.9 CHRONIC KIDNEY DISEASE, UNSPECIFIED CKD STAGE: ICD-10-CM

## 2019-07-23 DIAGNOSIS — I34.0 MITRAL VALVE INSUFFICIENCY, UNSPECIFIED ETIOLOGY: ICD-10-CM

## 2019-07-23 DIAGNOSIS — Z98.62 S/P PERIPHERAL ARTERY ANGIOPLASTY: ICD-10-CM

## 2019-07-23 DIAGNOSIS — I47.20 VT (VENTRICULAR TACHYCARDIA): ICD-10-CM

## 2019-07-23 DIAGNOSIS — I65.22 LEFT CAROTID ARTERY STENOSIS: ICD-10-CM

## 2019-07-23 DIAGNOSIS — I25.10 CAD S/P PERCUTANEOUS CORONARY ANGIOPLASTY: Primary | ICD-10-CM

## 2019-07-23 DIAGNOSIS — Z98.61 CAD S/P PERCUTANEOUS CORONARY ANGIOPLASTY: Primary | ICD-10-CM

## 2019-07-23 DIAGNOSIS — I48.91 ATRIAL FIBRILLATION, UNSPECIFIED TYPE (HCC): ICD-10-CM

## 2019-07-23 DIAGNOSIS — I50.42 CHRONIC COMBINED SYSTOLIC AND DIASTOLIC CONGESTIVE HEART FAILURE (HCC): ICD-10-CM

## 2019-07-23 PROCEDURE — 99215 OFFICE O/P EST HI 40 MIN: CPT | Performed by: INTERNAL MEDICINE

## 2019-07-23 RX ORDER — LOSARTAN POTASSIUM 50 MG/1
50 TABLET ORAL DAILY
Qty: 90 TABLET | Refills: 3 | Status: ON HOLD | OUTPATIENT
Start: 2019-07-23 | End: 2020-12-01 | Stop reason: SINTOL

## 2019-07-23 RX ORDER — FUROSEMIDE 40 MG/1
40 TABLET ORAL 2 TIMES DAILY
Qty: 180 TABLET | Refills: 3 | Status: SHIPPED | OUTPATIENT
Start: 2019-07-23

## 2019-07-23 ASSESSMENT — ENCOUNTER SYMPTOMS
SHORTNESS OF BREATH: 1
VOMITING: 0
ANAL BLEEDING: 0
TROUBLE SWALLOWING: 0
BLOOD IN STOOL: 0
COLOR CHANGE: 0
WHEEZING: 0
FACIAL SWELLING: 0
NAUSEA: 0
ABDOMINAL DISTENTION: 0
VOICE CHANGE: 0
DIARRHEA: 0
APNEA: 0
CHEST TIGHTNESS: 0

## 2019-08-12 ENCOUNTER — OFFICE VISIT (OUTPATIENT)
Dept: CARDIOLOGY CLINIC | Age: 84
End: 2019-08-12
Payer: COMMERCIAL

## 2019-08-12 VITALS
OXYGEN SATURATION: 98 % | HEIGHT: 77 IN | WEIGHT: 218 LBS | DIASTOLIC BLOOD PRESSURE: 84 MMHG | SYSTOLIC BLOOD PRESSURE: 122 MMHG | BODY MASS INDEX: 25.74 KG/M2 | RESPIRATION RATE: 20 BRPM | HEART RATE: 67 BPM

## 2019-08-12 DIAGNOSIS — I48.91 ATRIAL FIBRILLATION, UNSPECIFIED TYPE (HCC): ICD-10-CM

## 2019-08-12 DIAGNOSIS — I50.42 CHRONIC COMBINED SYSTOLIC AND DIASTOLIC CONGESTIVE HEART FAILURE (HCC): ICD-10-CM

## 2019-08-12 DIAGNOSIS — I34.0 MITRAL VALVE INSUFFICIENCY, UNSPECIFIED ETIOLOGY: ICD-10-CM

## 2019-08-12 DIAGNOSIS — Z98.61 CAD S/P PERCUTANEOUS CORONARY ANGIOPLASTY: Primary | ICD-10-CM

## 2019-08-12 DIAGNOSIS — Z98.62 S/P PERIPHERAL ARTERY ANGIOPLASTY: ICD-10-CM

## 2019-08-12 DIAGNOSIS — N18.9 CHRONIC KIDNEY DISEASE, UNSPECIFIED CKD STAGE: ICD-10-CM

## 2019-08-12 DIAGNOSIS — I25.10 CAD S/P PERCUTANEOUS CORONARY ANGIOPLASTY: Primary | ICD-10-CM

## 2019-08-12 DIAGNOSIS — I47.20 VT (VENTRICULAR TACHYCARDIA): ICD-10-CM

## 2019-08-12 PROCEDURE — 99214 OFFICE O/P EST MOD 30 MIN: CPT | Performed by: INTERNAL MEDICINE

## 2019-08-12 ASSESSMENT — ENCOUNTER SYMPTOMS
SHORTNESS OF BREATH: 1
NAUSEA: 0
CHEST TIGHTNESS: 0
BLOOD IN STOOL: 0
VOMITING: 0
APNEA: 0
ABDOMINAL DISTENTION: 0
COLOR CHANGE: 0
DIARRHEA: 0

## 2019-08-12 NOTE — PROGRESS NOTES
[facility] CARDIOLOGY OFFICE FOLLOW-UP      Patient: Derek Soria  YOB: 1935  MRN: [id number]    Chief Complaint:  Chief Complaint   Patient presents with    Follow-up    Shortness of Breath     MARTINEZ    Medication Check     Cozaar 50MG Daily & Lasix 40MG BID         Subjective/HPI:  8/12/19: Patient presents today for follow-up of cardiomyopathy. He had left main stenting done. Dose of Lasix was reduced from 2 tablets to 1 tablet a day and is more short of breath. He has atrial fibrillation. The dose of Lopressor was reduced from 50 twice a day to 25 twice a day and lately cut down to 25 once a day. Is still in the 62s. He has severe mitral regurgitation. LV ejection fraction 25%. I again talked to Sis [address]. He is on vacation. When he comes back on Monday he will try to see him early next week. Affect encouraged him to join cardiac rehab at [facility]. Metformin was discontinued. 7/23/19: Patient presents today for follow-up of cardiomyopathy and atrial fibrillation. Accompanied by daughter who is visiting from [place].  She is an RN. Detailed discussion with her about his condition. Repeat echocardiogram showed ejection fraction 20%. Which is similar to what we found about 4 months ago for the first time. Again they transthoracic echo shows severe mitral regurgitation. Previous echo has suggested to he underwent left main stent and ostial circumflex stent. With good results. Metformin was stopped by Dr. Gonzalez Hampton because of renal insufficiency. His GFR is in the mid 30s. He complains of having heart failure symptoms. I am going to increase the Lasix to 20 mg 2 tablets twice daily and losartan will be increased to 50 mg a day. Continue with Lopressor 50 twice daily. His age fibrillation rate is controlled in the 62s to 76s. Cannot use Entresto or Aldactone for same reason. Overall symptomatically he is better than he was 4 months ago by about 25%.   I told CAROTID ENDARTERECTOMY  8/2/12    CATARACT REMOVAL WITH IMPLANT  9/9/[facility]    [facility] DR. GRIDER     COLONOSCOPY  10/18/2012    [facility]    [facility]    UPPER GASTROINTESTINAL ENDOSCOPY  10/31/2017    DR ANAIS Gonzales Ban WISDOM TOOTH EXTRACTION  1950       Family History   Problem Relation Age of Onset    Diabetes Mother     Heart Disease Mother     Allergy (Severe) Mother        Social History     Socioeconomic History    Marital status:      Spouse name: None    Number of children: None    Years of education: None    Highest education level: None   Occupational History    None   Social Needs    Financial resource strain: None    Food insecurity:     Worry: None     Inability: None    Transportation needs:     Medical: None     Non-medical: None   Tobacco Use    Smoking status: Former Smoker     Packs/day: 0.50     Types: Cigarettes    Smokeless tobacco: Never Used   Substance and Sexual Activity    Alcohol use:  Yes    Drug use: No    Sexual activity: Yes     Partners: Female   Lifestyle    Physical activity:     Days per week: None     Minutes per session: None    Stress: None   Relationships    Social connections:     Talks on phone: None     Gets together: None     Attends Quaker service: None     Active member of club or organization: None     Attends meetings of clubs or organizations: None     Relationship status: None    Intimate partner violence:     Fear of current or ex partner: None     Emotionally abused: None     Physically abused: None     Forced sexual activity: None   Other Topics Concern    None   Social History Narrative    None       No Known Allergies    Current Outpatient Medications   Medication Sig Dispense Refill    furosemide (LASIX) 40 MG tablet Take 1 tablet by mouth 2 times daily 180 tablet 3    losartan (COZAAR) 50 MG tablet Take 1 tablet by mouth daily 90 tablet 3    atorvastatin (LIPITOR) 10 MG tablet TAKE ONE TABLET BY MOUTH EVERY DAY 90 tablet 3    ferrous sulfate 325 (65 Fe) MG tablet Take 325 mg by mouth      vitamin B-12 (CYANOCOBALAMIN) 1000 MCG tablet Take 1,000 mcg by mouth      metoprolol succinate (TOPROL XL) 50 MG extended release tablet Take 1 tablet by mouth 2 times daily (Patient taking differently: Take 25 mg by mouth daily ) 180 tablet 3    tamsulosin (FLOMAX) 0.4 MG capsule Take 1 capsule by mouth nightly 90 capsule 0    albuterol sulfate  (90 Base) MCG/ACT inhaler Inhale 2 puffs into the lungs every 6 hours as needed for Wheezing 1 Inhaler 0    rivaroxaban (XARELTO) 15 MG TABS tablet Take 1 tablet by mouth daily (with breakfast) 90 tablet 3    esomeprazole (NEXIUM) 40 MG delayed release capsule TAKE ONE CAPSULE BY MOUTH EVERY MORNING BEFORE BREAKFAST 90 capsule 1    finasteride (PROSCAR) 5 MG tablet TAKE ONE TABLET BY MOUTH EVERY DAY 90 tablet 1    metFORMIN (GLUCOPHAGE) 1000 MG tablet TAKE ONE TABLET BY MOUTH TWO TIMES A DAY FOR DIABETES 180 tablet 2     No current facility-administered medications for this visit. Review of Systems:   Review of Systems   Constitutional: Negative for appetite change, diaphoresis and fatigue. HENT: Negative for nosebleeds. Respiratory: Positive for shortness of breath. Negative for apnea and chest tightness. Cardiovascular: Negative for chest pain, palpitations and leg swelling. Gastrointestinal: Negative for abdominal distention, blood in stool, diarrhea, nausea and vomiting. Musculoskeletal: Negative for myalgias, neck pain and neck stiffness. Skin: Negative for color change, pallor, rash and wound. Neurological: Negative for dizziness, seizures, syncope, weakness, light-headedness, numbness and headaches. Hematological: Does not bruise/bleed easily. Psychiatric/Behavioral: Negative for agitation, behavioral problems and confusion. The patient is not nervous/anxious and is not hyperactive. All other systems reviewed and are negative.       Review of System is

## 2019-08-19 DIAGNOSIS — I25.10 CORONARY ARTERY DISEASE, ANGINA PRESENCE UNSPECIFIED, UNSPECIFIED VESSEL OR LESION TYPE, UNSPECIFIED WHETHER NATIVE OR TRANSPLANTED HEART: ICD-10-CM

## 2019-08-19 RX ORDER — CLOPIDOGREL BISULFATE 75 MG/1
TABLET ORAL
Qty: 90 TABLET | Refills: 3 | OUTPATIENT
Start: 2019-08-19

## 2019-08-20 ENCOUNTER — TELEPHONE (OUTPATIENT)
Dept: CARDIOLOGY CLINIC | Age: 84
End: 2019-08-20

## 2019-08-20 DIAGNOSIS — I25.10 CORONARY ARTERY DISEASE, ANGINA PRESENCE UNSPECIFIED, UNSPECIFIED VESSEL OR LESION TYPE, UNSPECIFIED WHETHER NATIVE OR TRANSPLANTED HEART: ICD-10-CM

## 2019-08-20 RX ORDER — CLOPIDOGREL BISULFATE 75 MG/1
TABLET ORAL
Qty: 90 TABLET | Refills: 3 | Status: SHIPPED | OUTPATIENT
Start: 2019-08-20

## 2019-08-27 ENCOUNTER — TELEPHONE (OUTPATIENT)
Dept: CARDIOLOGY CLINIC | Age: 84
End: 2019-08-27

## 2019-08-27 NOTE — TELEPHONE ENCOUNTER
Patient calling. When he was in office for last visit he was told he would be getting a phone call regarding beng referred to have a \"clip\" put on his valve. He has not heard anything as of yet. Please advise.

## 2019-08-30 LAB — GLUCOSE BLD-MCNC: 137 MG/DL (ref 74–99)

## 2019-09-05 ENCOUNTER — TELEPHONE (OUTPATIENT)
Dept: CARDIOLOGY CLINIC | Age: 84
End: 2019-09-05

## 2019-09-05 DIAGNOSIS — I07.1 SEVERE TRICUSPID REGURGITATION BY PRIOR ECHOCARDIOGRAM: ICD-10-CM

## 2019-09-05 DIAGNOSIS — I34.0 SEVERE MITRAL REGURGITATION BY PRIOR ECHOCARDIOGRAM: Primary | ICD-10-CM

## 2019-09-05 DIAGNOSIS — R93.1 ABNORMAL ECHOCARDIOGRAM: ICD-10-CM

## 2020-12-01 ENCOUNTER — HOSPITAL ENCOUNTER (INPATIENT)
Age: 85
LOS: 3 days | Discharge: HOME | DRG: 194 | End: 2020-12-04
Attending: FAMILY MEDICINE | Admitting: FAMILY MEDICINE
Payer: MEDICARE

## 2020-12-01 ENCOUNTER — APPOINTMENT (OUTPATIENT)
Dept: GENERAL RADIOLOGY | Age: 85
DRG: 194 | End: 2020-12-01
Attending: FAMILY MEDICINE
Payer: MEDICARE

## 2020-12-01 PROBLEM — J18.9 PNEUMONIA: Status: ACTIVE | Noted: 2020-12-01

## 2020-12-01 LAB
ANION GAP SERPL CALCULATED.3IONS-SCNC: 11 MEQ/L (ref 9–15)
BASOPHILS ABSOLUTE: 0 K/UL (ref 0–0.2)
BASOPHILS RELATIVE PERCENT: 0.8 %
BUN BLDV-MCNC: 44 MG/DL (ref 8–23)
CALCIUM SERPL-MCNC: 8.8 MG/DL (ref 8.5–9.9)
CHLORIDE BLD-SCNC: 102 MEQ/L (ref 95–107)
CO2: 20 MEQ/L (ref 20–31)
CREAT SERPL-MCNC: 2.49 MG/DL (ref 0.7–1.2)
EOSINOPHILS ABSOLUTE: 0.1 K/UL (ref 0–0.7)
EOSINOPHILS RELATIVE PERCENT: 1.2 %
GFR AFRICAN AMERICAN: 30.1
GFR NON-AFRICAN AMERICAN: 24.9
GLUCOSE BLD-MCNC: 146 MG/DL (ref 70–99)
HCT VFR BLD CALC: 26.2 % (ref 42–52)
HEMOGLOBIN: 8.7 G/DL (ref 14–18)
LYMPHOCYTES ABSOLUTE: 0.7 K/UL (ref 1–4.8)
LYMPHOCYTES RELATIVE PERCENT: 15.3 %
MCH RBC QN AUTO: 33.5 PG (ref 27–31.3)
MCHC RBC AUTO-ENTMCNC: 33.2 % (ref 33–37)
MCV RBC AUTO: 100.9 FL (ref 80–100)
MONOCYTES ABSOLUTE: 0.5 K/UL (ref 0.2–0.8)
MONOCYTES RELATIVE PERCENT: 9.4 %
NEUTROPHILS ABSOLUTE: 3.6 K/UL (ref 1.4–6.5)
NEUTROPHILS RELATIVE PERCENT: 73.3 %
PDW BLD-RTO: 14.3 % (ref 11.5–14.5)
PLATELET # BLD: 121 K/UL (ref 130–400)
POTASSIUM REFLEX MAGNESIUM: 5.3 MEQ/L (ref 3.4–4.9)
PRO-BNP: NORMAL PG/ML
RBC # BLD: 2.6 M/UL (ref 4.7–6.1)
SARS-COV-2, NAAT: NOT DETECTED
SODIUM BLD-SCNC: 133 MEQ/L (ref 135–144)
WBC # BLD: 4.9 K/UL (ref 4.8–10.8)

## 2020-12-01 PROCEDURE — 99255 IP/OBS CONSLTJ NEW/EST HI 80: CPT | Performed by: INTERNAL MEDICINE

## 2020-12-01 PROCEDURE — 87040 BLOOD CULTURE FOR BACTERIA: CPT

## 2020-12-01 PROCEDURE — 6360000002 HC RX W HCPCS: Performed by: FAMILY MEDICINE

## 2020-12-01 PROCEDURE — 6370000000 HC RX 637 (ALT 250 FOR IP): Performed by: INTERNAL MEDICINE

## 2020-12-01 PROCEDURE — 80048 BASIC METABOLIC PNL TOTAL CA: CPT

## 2020-12-01 PROCEDURE — 83880 ASSAY OF NATRIURETIC PEPTIDE: CPT

## 2020-12-01 PROCEDURE — 85025 COMPLETE CBC W/AUTO DIFF WBC: CPT

## 2020-12-01 PROCEDURE — 71045 X-RAY EXAM CHEST 1 VIEW: CPT

## 2020-12-01 PROCEDURE — 2580000003 HC RX 258: Performed by: FAMILY MEDICINE

## 2020-12-01 PROCEDURE — U0002 COVID-19 LAB TEST NON-CDC: HCPCS

## 2020-12-01 PROCEDURE — 1210000000 HC MED SURG R&B

## 2020-12-01 PROCEDURE — 36415 COLL VENOUS BLD VENIPUNCTURE: CPT

## 2020-12-01 PROCEDURE — 99254 IP/OBS CNSLTJ NEW/EST MOD 60: CPT | Performed by: INTERNAL MEDICINE

## 2020-12-01 RX ORDER — ALPRAZOLAM 0.25 MG/1
0.25 TABLET ORAL 3 TIMES DAILY PRN
Status: DISCONTINUED | OUTPATIENT
Start: 2020-12-01 | End: 2020-12-04 | Stop reason: HOSPADM

## 2020-12-01 RX ORDER — ONDANSETRON 2 MG/ML
4 INJECTION INTRAMUSCULAR; INTRAVENOUS EVERY 6 HOURS PRN
Status: DISCONTINUED | OUTPATIENT
Start: 2020-12-01 | End: 2020-12-04 | Stop reason: HOSPADM

## 2020-12-01 RX ORDER — POLYETHYLENE GLYCOL 3350 17 G/17G
17 POWDER, FOR SOLUTION ORAL DAILY PRN
Status: DISCONTINUED | OUTPATIENT
Start: 2020-12-01 | End: 2020-12-04 | Stop reason: HOSPADM

## 2020-12-01 RX ORDER — SODIUM CHLORIDE 0.9 % (FLUSH) 0.9 %
10 SYRINGE (ML) INJECTION PRN
Status: DISCONTINUED | OUTPATIENT
Start: 2020-12-01 | End: 2020-12-04 | Stop reason: HOSPADM

## 2020-12-01 RX ORDER — PROMETHAZINE HYDROCHLORIDE 12.5 MG/1
12.5 TABLET ORAL EVERY 6 HOURS PRN
Status: DISCONTINUED | OUTPATIENT
Start: 2020-12-01 | End: 2020-12-04 | Stop reason: HOSPADM

## 2020-12-01 RX ORDER — ACETAMINOPHEN 325 MG/1
650 TABLET ORAL EVERY 6 HOURS PRN
Status: DISCONTINUED | OUTPATIENT
Start: 2020-12-01 | End: 2020-12-04 | Stop reason: HOSPADM

## 2020-12-01 RX ORDER — SPIRONOLACTONE 25 MG/1
12.5 TABLET ORAL DAILY
Status: ON HOLD | COMMUNITY
End: 2020-12-04 | Stop reason: HOSPADM

## 2020-12-01 RX ORDER — HYDROCODONE BITARTRATE AND ACETAMINOPHEN 5; 325 MG/1; MG/1
1 TABLET ORAL EVERY 6 HOURS PRN
Status: ON HOLD | COMMUNITY
End: 2020-12-04 | Stop reason: HOSPADM

## 2020-12-01 RX ORDER — ACETAMINOPHEN 650 MG/1
650 SUPPOSITORY RECTAL EVERY 6 HOURS PRN
Status: DISCONTINUED | OUTPATIENT
Start: 2020-12-01 | End: 2020-12-04 | Stop reason: HOSPADM

## 2020-12-01 RX ORDER — SODIUM CHLORIDE 0.9 % (FLUSH) 0.9 %
10 SYRINGE (ML) INJECTION EVERY 12 HOURS SCHEDULED
Status: DISCONTINUED | OUTPATIENT
Start: 2020-12-01 | End: 2020-12-04 | Stop reason: HOSPADM

## 2020-12-01 RX ORDER — ATORVASTATIN CALCIUM 10 MG/1
10 TABLET, FILM COATED ORAL NIGHTLY
Status: DISCONTINUED | OUTPATIENT
Start: 2020-12-01 | End: 2020-12-04 | Stop reason: HOSPADM

## 2020-12-01 RX ORDER — MIDODRINE HYDROCHLORIDE 2.5 MG/1
2.5 TABLET ORAL DAILY
COMMUNITY

## 2020-12-01 RX ORDER — CLOPIDOGREL BISULFATE 75 MG/1
75 TABLET ORAL DAILY
Status: DISCONTINUED | OUTPATIENT
Start: 2020-12-01 | End: 2020-12-04 | Stop reason: HOSPADM

## 2020-12-01 RX ORDER — DIMENHYDRINATE 50 MG
1 TABLET ORAL DAILY
COMMUNITY

## 2020-12-01 RX ADMIN — ATORVASTATIN CALCIUM 10 MG: 10 TABLET, FILM COATED ORAL at 20:21

## 2020-12-01 RX ADMIN — Medication 10 ML: at 20:21

## 2020-12-01 RX ADMIN — CLOPIDOGREL BISULFATE 75 MG: 75 TABLET, FILM COATED ORAL at 09:30

## 2020-12-01 RX ADMIN — AZITHROMYCIN 500 MG: 500 INJECTION, POWDER, LYOPHILIZED, FOR SOLUTION INTRAVENOUS at 20:11

## 2020-12-01 RX ADMIN — ENOXAPARIN SODIUM 40 MG: 40 INJECTION SUBCUTANEOUS at 09:30

## 2020-12-01 RX ADMIN — CEFTRIAXONE SODIUM 1 G: 1 INJECTION, POWDER, FOR SOLUTION INTRAMUSCULAR; INTRAVENOUS at 22:08

## 2020-12-01 RX ADMIN — ALPRAZOLAM 0.25 MG: 0.25 TABLET ORAL at 20:10

## 2020-12-01 ASSESSMENT — PAIN SCALES - GENERAL
PAINLEVEL_OUTOF10: 0
PAINLEVEL_OUTOF10: 0
PAINLEVEL_OUTOF10: 5

## 2020-12-01 ASSESSMENT — ENCOUNTER SYMPTOMS
SHORTNESS OF BREATH: 1
EYES NEGATIVE: 1
STRIDOR: 0
GASTROINTESTINAL NEGATIVE: 1
COUGH: 1
WHEEZING: 0
NAUSEA: 0
CHEST TIGHTNESS: 0
BLOOD IN STOOL: 0

## 2020-12-01 ASSESSMENT — PAIN DESCRIPTION - DESCRIPTORS: DESCRIPTORS: CONSTANT;CRAMPING

## 2020-12-01 ASSESSMENT — PAIN DESCRIPTION - LOCATION: LOCATION: ABDOMEN

## 2020-12-01 ASSESSMENT — PAIN DESCRIPTION - PAIN TYPE: TYPE: CHRONIC PAIN

## 2020-12-01 NOTE — FLOWSHEET NOTE
0930-Pt moaning and calling out. Nurse in room to give meds and assess the pt. Pt was anxious and stated to the nurse that he doesn't like being isolated and couldn't find his call bell. Pt was reminded he was in covid isolation and the staff tries to avoid coming in the room frequently. Pt is in understanding. Pt was given a phone for his room and his call bell. 1400 Pt has been calling frequently stating he is having panic attacks. Pt also states that when he moves around he gets short of breath. Pt offered lots of emotional support throughout the day. 1800  Pt still c/o having panic attacks. Earlier this nurse did assist the pt to sitting position and he stood briefly at the bedside with his cane. The patient did ok but did get short of breath.

## 2020-12-01 NOTE — H&P
Hospital Medicine  History and Physical    Patient:  Rik Whittington  MRN: [id number]    CHIEF COMPLAINT:  No chief complaint on file. History Obtained From:  patient  Primary Care Physician: Bridger Ball DO    HISTORY OF PRESENT ILLNESS:   The patient is a 82 y.o. 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 Patient states he has had approximately 3 weeks of progressively worsening shortness of breath. However he has been managing at home and still able to ambulate. Over the last 24 hours he has been unable to ambulate more than a few steps without becoming extremely short of breath. He denies any leg swelling. He denies fever and chills. Does report cough nonproductive. He does state he had multiple family members over for Thanksgiving from out of town however all of them were tested for Covid prior to arrival.  Nobody had any symptoms of respiratory illness. He currently denies chest pain, states shortness of breath is minimal at rest however increases with activity. Past Medical History:      Diagnosis Date    Combined systolic and diastolic congestive heart failure ([facility] [address] 2/21/2019    Constipation x 2weeks 6/18/2014    Diverticulosis of sigmoid colon 02/03/2016    DR. MANZO    Hypercholesterolemia     LBP (low back pain) 6/18/2014    LBP (low back pain) 6/18/2014    OA (osteoarthritis)     Polyp, colonic 02/03/2016    DR. MANZO    Smoking hx     SOB (shortness of breath) 2/20/2019    Tinea manuum 7/22/2015    Type II or unspecified type diabetes mellitus without mention of complication, not stated as uncontrolled        Past Surgical History:      Procedure Laterality Date    AORTIC VALVE REPLACEMENT  11/15/2019    DR Marquez Reese CARDIAC CATHETERIZATION  06/13/2019    DR ROBERTS   [facility] CAROTID ENDARTERECTOMY  8/2/12    CATARACT REMOVAL WITH IMPLANT  9/9/13    [facility] DR. GRIDER     COLONOSCOPY  10/18/2012    [facility]  [facility]3/16    [facility]    UPPER GASTROINTESTINAL ENDOSCOPY  10/31/2017    DR MANZO   [facility] WISDOM TOOTH EXTRACTION  1950       Medications Prior to Admission:    Prior to Admission medications    Medication Sig Start Date End Date Taking? Authorizing Provider   rivaroxaban (XARELTO) 15 MG TABS tablet Take 1 tablet by mouth daily (with breakfast) 4/21/20   Debbie Gerardo MD   carvedilol (COREG) 3.125 MG tablet  1/12/20   Historical Provider, MD   ENTRESTO 24-26 MG per tablet  1/15/20   Historical Provider, MD   metOLazone (ZAROXOLYN) 10 MG tablet Take 10 mg by mouth daily    Historical Provider, MD   lidocaine (LMX) 4 % cream Apply a half dollar sized amount to intact skin topically up to twice daily as needed for pain 1/16/20   Concepcion Lee MD   gabapentin (NEURONTIN) 100 MG capsule Take 1 capsule by mouth every evening for 30 days.  1/16/20 2/15/20  Concepcion Lee MD   clopidogrel (PLAVIX) 75 MG tablet TAKE ONE TABLET BY MOUTH EVERY MORNING 8/20/19   Debbie Gerardo MD   furosemide (LASIX) 40 MG tablet Take 1 tablet by mouth 2 times daily 7/23/19   Debbie Gerardo MD   losartan (COZAAR) 50 MG tablet Take 1 tablet by mouth daily 7/23/19   Debbie Gerardo MD   atorvastatin (LIPITOR) 10 MG tablet TAKE ONE TABLET BY MOUTH EVERY DAY 6/26/19   Debbie Gerardo MD   ferrous sulfate 325 (65 Fe) MG tablet Take 325 mg by mouth    Historical Provider, MD   vitamin B-12 (CYANOCOBALAMIN) 1000 MCG tablet Take 1,000 mcg by mouth    Historical Provider, MD   metoprolol succinate (TOPROL XL) 50 MG extended release tablet Take 1 tablet by mouth 2 times daily  Patient taking differently: Take 25 mg by mouth daily  4/19/19   Debbie Gerardo MD   tamsulosin (FLOMAX) 0.4 MG capsule Take 1 capsule by mouth nightly 3/29/19   Donna Rodriguez, APRN - CNP   albuterol sulfate  (90 Base) MCG/ACT inhaler Inhale 2 puffs into the lungs every 6 hours as needed for Wheezing 3/26/19   Tali Tino, APRN - CNP   esomeprazole ([address]) 40 MG delayed release capsule TAKE ONE CAPSULE BY MOUTH EVERY MORNING BEFORE BREAKFAST 2/24/19   Christina Service, DO   finasteride (PROSCAR) 5 MG tablet TAKE ONE TABLET BY MOUTH EVERY DAY 2/4/19   Christina Service, DO   metFORMIN (GLUCOPHAGE) 1000 MG tablet TAKE ONE TABLET BY MOUTH TWO TIMES A DAY FOR DIABETES 8/5/18   Christina Service, DO       Allergies:  Patient has no known allergies. Social History:   TOBACCO:   reports that he has quit smoking. His smoking use included cigarettes. He smoked 0.50 packs per day. He has never used smokeless tobacco.  ETOH:   reports current alcohol use. OCCUPATION:  retired    Family History:       Problem Relation Age of Onset    Diabetes Mother     Heart Disease Mother     Allergy (Severe) Mother        REVIEW OF SYSTEMS:  Ten systems reviewed and negative except for as above. Physical Exam:    Vitals: There were no vitals taken for this visit. Constitutional: alert, appears stated age and cooperative  Skin: Skin color, texture, turgor normal. No rashes or lesions  Eyes:Eye: Normal external eye, conjunctiva, SHIMON. ENT: Head: Normocephalic, no lesions, without obvious abnormality.   Neck: no adenopathy, no carotid bruit, no JVD, supple, symmetrical, trachea midline and thyroid not enlarged, symmetric, no tenderness/mass/nodules  Respiratory: clear to auscultation bilaterally  Cardiovascular: regular rate and rhythm, S1, S2 normal, no murmur, click, rub or gallop  Gastrointestinal: soft, non-tender; bowel sounds normal; no masses,  no organomegaly  Genitourinary: Deferred  Musculoskeletal:extremities normal, atraumatic, no cyanosis or edema  Neurologic: Mental status AAOx3 No facial asymmetry or droop. Normal muscle strength b/l. CN II-XII grossly intact. Psychiatric: Appropriate mood and affect. Good insight and judgement  Hematologic: No obvious bruising or bleeding        Assessment and Plan   1. Community-acquired pneumonia  1. IV Rocephin and azithromycin. Patient not hypoxic at rest.  Pulmonary consultation. Blood cultures x2. Covid negative x1, confirmatory later today. 2. History combined systolic and diastolic congestive heart failure  1. Strong suspicion for acute exacerbation however emergency room work-up was at [facility] facility and only records available were discussed by phone with ER attending. Obtain echocardiogram, BNP, cardiology consultation. With patient's clear lung exam, will hold off any Lasix at this time however likely will initiate if findings are positive. Patient's history of several weeks of progressively worsening shortness of breath are strongly suspicious for cardiac cause. 3. Diet-controlled diabetes mellitus type 2  1. Monitor, no sliding scale insulin at this time. 4. Hypertension, hyperlipidemia  1. Patient states he also has episodes of hypotension and takes midodrine will continue  5. DVT prophylaxis  1.  Subcu Lovenox    Patient Active Problem List   Diagnosis Code    Smoking hx Z87.891    Hypercholesterolemia E78.00    Low back pain M54.5    Constipation x 2weeks K59.00    OA (osteoarthritis of spine) M47.9    Tinea manuum B35.2    Abdominal wall pain in right lower quadrant R10.31    Postlaminectomy syndrome, lumbar M96.1    Abnormality of gait R26.9    Prostate cancer ([facility] [address] C61    VT (ventricular tachycardia) ([facility]) I47.2    Left carotid artery stenosis I65.22    Pre-diabetes R73.03    After-cataract, obscuring vision H26.499    Atherosclerosis of native coronary artery of native heart without angina pectoris I25.10    Abdominal wall bulge R19.00    SOB (shortness of breath) R06.02    Combined systolic and diastolic congestive heart failure ([facility] [address] I50.40    Chronic stable angina (HCC) I20.8    Retinal embolus, 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

## 2020-12-01 NOTE — ACP (ADVANCE CARE PLANNING)
Advance Care Planning     Advance Care Planning Activator (Inpatient)  Conversation Note      Date of ACP Conversation: 11/30/2020    Conversation Conducted with: Patient with Decision Making Capacity    ACP Activator: [address] makes decisions on behalf of the incapacitated patient: Decision Maker is asked to consider and make decisions based on patient values, known preferences, or best interests. Health Care Decision Maker:     Current Designated Health Care Decision Maker:   Primary Decision Maker: Nathan Dirk - [phone]  (If there is a [address] named in the \"Healthcare Decision Makers\" box in the ACP activity, but it is not visible above, be sure to open that field and then select the health care decision maker relationship (ie \"primary\") in the blank space to the right of the name.) Validate  this information as still accurate & up-to-date; edit [place] field as needed.)    Note: Assess and validate information in current ACP documents, as indicated. If no Decision Maker listed above or available through scanned documents, then:    If no Authorized Decision Maker has previously been identified, then patient chooses [place]:  \"Who would you like to name as your primary health care decision-maker? \"               Name: Jamil Ernst  Relationship: WIFE          Phone number: [phone]  [facility] this person be reached easily? \" Yes  \"Who would you like to name as your back-up decision maker? \"   Name: Marlon Holguin        Relationship: DTR         Phone number: [facility] this person be reached easily? \"     Note: If the relationship of these Decision-Makers to the patient does NOT follow your state's Next of Kin hierarchy, recommend that patient complete ACP document that meets state-specific requirements to allow them to act on the patient's behalf when appropriate. Care Preferences    Ventilation:   \"If you were in your present state of health and suddenly became very ill and were unable to breathe on your own, what would your preference be about the use of a ventilator (breathing machine) if it were available to you? \"      Would the patient desire the use of ventilator (breathing machine)?: yes    \"If your health worsens and it becomes clear that your chance of recovery is unlikely, what would your preference be about the use of a ventilator (breathing machine) if it were available to you? \"     Would the patient desire the use of ventilator (breathing machine)?: No      Resuscitation  \"CPR works best to restart the heart when there is a sudden event, like a heart attack, in someone who is otherwise healthy. Unfortunately, CPR does not typically restart the heart for people who have serious health conditions or who are very sick. \"    \"In the event your heart stopped as a result of an underlying serious health condition, would you want attempts to be made to restart your heart (answer \"yes\" for attempt to resuscitate) or would you prefer a natural death (answer \"no\" for do not attempt to resuscitate)? \" yes      NOTE: If the patient has a valid advance directive AND now provides care preference(s) that are inconsistent with that prior directive, advise the patient to consider either: creating a new advance directive that complies with state-specific requirements; or, if that is not possible, orally revoking that prior directive in accordance with state-specific requirements, which must be documented in the EHR. [x] Yes   [] No   Educated Patient / Scott Jj regarding differences between Advance Directives and portable DNR orders.     Length of ACP Conversation in minutes:      Conversation Outcomes:  [x] ACP discussion completed  [] Existing advance directive reviewed with patient; no changes to patient's previously recorded wishes  [] New Advance Directive completed  [] Portable Do Not Rescitate prepared for Provider review and signature  [] POLST/POST/MOLST/MOST prepared for Provider review and signature      Follow-up plan:    [x] Schedule follow-up conversation to continue planning  [] Referred individual to Provider for additional questions/concerns   [] Advised patient/agent/surrogate to review completed ACP document and update if needed with changes in condition, patient preferences or care setting    [x] This note routed to one or more involved healthcare providers

## 2020-12-01 NOTE — PROGRESS NOTES
Department of Internal Medicine  General Internal Medicine  Attending Progress Note      SUBJECTIVE:  Pt seen and examined. Pt with multiple complaints including anxiety, continued sob and complaining of low blood pressure. Repeat COVID pending and echo ordered and pending. OBJECTIVE      Medications    Current Facility-Administered Medications: sodium chloride flush 0.9 % injection 10 mL, 10 mL, Intravenous, 2 times per day  sodium chloride flush 0.9 % injection 10 mL, 10 mL, Intravenous, PRN  acetaminophen (TYLENOL) tablet 650 mg, 650 mg, Oral, Q6H PRN **OR** acetaminophen (TYLENOL) suppository 650 mg, 650 mg, Rectal, Q6H PRN  polyethylene glycol (GLYCOLAX) packet 17 g, 17 g, Oral, Daily PRN  promethazine (PHENERGAN) tablet 12.5 mg, 12.5 mg, Oral, Q6H PRN **OR** ondansetron (ZOFRAN) injection 4 mg, 4 mg, Intravenous, Q6H PRN  enoxaparin (LOVENOX) injection 40 mg, 40 mg, Subcutaneous, Daily  azithromycin (ZITHROMAX) 500 mg in D5W 250ml addavial, 500 mg, Intravenous, Q24H **AND** cefTRIAXone (ROCEPHIN) 1 g IVPB in 50 mL D5W minibag, 1 g, Intravenous, Q24H  clopidogrel (PLAVIX) tablet 75 mg, 75 mg, Oral, Daily  atorvastatin (LIPITOR) tablet 10 mg, 10 mg, Oral, Nightly  Physical    VITALS:  /60   Pulse (!) 49   Temp 97.3 °F (36.3 °C) (Oral)   Resp 20   Ht 6' 5\" (1.956 m)   Wt 213 lb (96.6 kg)   SpO2 99%   BMI 25.26 kg/m²   Constitutional: Awake and alert in no acute distress.  Lying in bed comfortably  Head: Normocephalic, atraumatic  Eyes: EOMI, PERRLA  ENT: moist mucous membranes  Neck: neck supple, trachea midline  Lungs: Good inspiratory effort, no wheeze, no rhonchi, no rales  Heart: RRR, normal S1 and S2  GI: Soft, non-distended, non tender, no guarding, no rebound, +BS  MSK: no edema noted  Skin: warm, dry  Psych: appropriate affect     Data    CBC:   Lab Results   Component Value Date    WBC 4.9 12/01/2020    RBC 2.60 12/01/2020    HGB 8.7 12/01/2020    HCT 26.2 12/01/2020    .9 12/01/2020    MCH 33.5 12/01/2020    MCHC 33.2 12/01/2020    RDW 14.3 12/01/2020     12/01/2020    MPV 11.2 03/23/2015     CMP:    Lab Results   Component Value Date     12/01/2020    K 5.3 12/01/2020     12/01/2020    CO2 20 12/01/2020    BUN 44 12/01/2020    CREATININE 2.49 12/01/2020    GFRAA 30.1 12/01/2020    LABGLOM 24.9 12/01/2020    GLUCOSE 146 12/01/2020    GLUCOSE 115 02/27/2012    PROT 6.9 03/26/2019    LABALBU 4.1 03/26/2019    LABALBU 4.4 02/27/2012    CALCIUM 8.8 12/01/2020    BILITOT 0.5 03/26/2019    ALKPHOS 62 03/26/2019    AST 20 03/26/2019    ALT 16 03/26/2019       ASSESSMENT AND PLAN      # CAP  - continuing Rocephin/azithro  - pulm consulted  - covid negative x1. Repeat pending. May remove isolation precautions if negative  - O2 as needed. On room air    # Combined systolic and diastolic CHF - not acute  - lungs clear  - BNP elevated due to cardiomyopathy  - cardiology consulted  - echo ordered and pending    # DM2  - monitor. No insulin as patient diet controlled at home    # HTN, HLD  - cont home meds  - monitor for hypotension    DVT: lovenox    Disposition: Repeat covid pending. Echo ordered and pending. Cardiology and pulm consulted. PT/OT.        Chirag Richard DO  Internal Medicine

## 2020-12-01 NOTE — CONSULTS
Spiritual Care Services     Summary of Visit:  Ronald Villatoro called patient in his room to discuss advanced directives. Patient is currently in isolation due to Covid.  explained advanced directives and the process by which advanced directives can be completed.  gave nurse the packet to take to patient in his room. Spiritual Assessment/Intervention/Outcomes:    Encounter Summary  Services provided to[de-identified] Patient  Referral/Consult From[de-identified] Nurse  Support System: Spouse  Continue Visiting: No  Complexity of Encounter: Moderate  Length of Encounter: 15 minutes                 Advance Directives (For Healthcare)  Pre-existing DNR Comfort Care/DNR Arrest/DNI Order: No  Healthcare Directive: No, patient does not have an advance directive for healthcare treatment  Type of Healthcare Directive: Durable power of  for health care, Living will  Information on Healthcare Directives Requested: Yes  Patient Requests Assistance: Yes, referral made to   Advance Directives: Documents given, Documents explained           Values / Beliefs  Do you have any ethnic, cultural, sacramental, or spiritual Protestant needs you would like us to be aware of while you are in the hospital?: No    Care Plan:        [id number] [address]itas   Electronically signed by Wanda Elizabeth on 12/1/20 at 10:25 AM EST     To reach a  for emotional and spiritual support, place an [facility] consult request.   If a  is needed immediately, dial 0 and ask to page the on-call .

## 2020-12-01 NOTE — PROGRESS NOTES
Nutrition Education    · Referral recieved for CHF education, pt currently in droplet precautions, r/o COIVD, followup for d/c needs    Electronically signed by Melissa Paul RD, LD on 12/1/20 at 2:55 PM EST

## 2020-12-01 NOTE — CONSULTS
Pulmonary Medicine  Consult Note      Reason for consultation: Community-acquired pneumonia    Consulting physician: Dr. Rajesh White:      This is a 47-year-old male who was presented with history of coronary artery disease, aortic valve replacement, pacemaker placement, hypertension, diet-controlled diabetes mellitus.,  Hypertension with hypotensive episode, congestive heart failure, hyperlipidemia, prostate cancer who was presented to [facility] as a transfer from [address] [place] [address] emergency room. He has complaint of shortness of breath for last 3 weeks. In last 24-hour he could not walk few steps without getting short of breath. He has no leg swelling. He has no fever or chills. He has some cough which is mostly dry. No chest pain or pleuritic pain. He had COVID-19 test negative at [email or web address] [facility] and second test was done today was negative. He denies having any nausea, vomiting, diarrhea or abdominal pain. Chest x-ray shows right basilar infiltration atelectasis and right pleural thickening. He is on IV Rocephin and Zithromax. Past Medical History:        Diagnosis Date    Combined systolic and diastolic congestive heart failure ([facility] 2/21/2019    Constipation x 2weeks 6/18/2014    Diverticulosis of sigmoid colon 02/03/2016    DR. MANZO    Hypercholesterolemia     LBP (low back pain) 6/18/2014    LBP (low back pain) 6/18/2014    OA (osteoarthritis)     Polyp, colonic 02/03/2016    DR. MANZO    [facility]     Smoking hx     SOB (shortness of breath) 2/20/2019    Tinea manuum 7/22/2015    Type II or unspecified type diabetes mellitus without mention of complication, not stated as uncontrolled        Past Surgical History:        Procedure Laterality Date    AORTIC VALVE REPLACEMENT  11/15/2019    DR Young Lopez    CARDIAC CATHETERIZATION  06/13/2019    DR ALEJANDRA Navarro CAROTID ENDARTERECTOMY  8/2/12    CATARACT REMOVAL WITH IMPLANT  9/9/13 [facility] DR. GRIDER     COLONOSCOPY  10/18/2012    [facility]    [facility]    UPPER GASTROINTESTINAL ENDOSCOPY  10/31/2017    DR ANAIS HillmanRaymond Medal WISDOM TOOTH EXTRACTION  1950       Social History:     reports that he has quit smoking. His smoking use included cigarettes. He has a 35.00 pack-year smoking history. He has never used smokeless tobacco. He reports current alcohol use. He reports that he does not use drugs. Family History:       Problem Relation Age of Onset    Diabetes Mother     Heart Disease Mother     Allergy (Severe) Mother        Allergies:  Patient has no known allergies. MEDICATIONS during current hospitalization:    Continuous Infusions:    Scheduled Meds:   sodium chloride flush  10 mL Intravenous 2 times per day    enoxaparin  40 mg Subcutaneous Daily    azithromycin  500 mg Intravenous Q24H    And    cefTRIAXone (ROCEPHIN) IV  1 g Intravenous Q24H    clopidogrel  75 mg Oral Daily    atorvastatin  10 mg Oral Nightly       PRN Meds:sodium chloride flush, acetaminophen **OR** acetaminophen, polyethylene glycol, promethazine **OR** ondansetron    REVIEW OF SYSTEMS:  As in history of present illness  Other 14 point review of system is negative. PHYSICAL EXAM:    Vitals:  /60   Pulse (!) 49   Temp 97.3 °F (36.3 °C) (Oral)   Resp 20   Ht 6' 5\" (1.956 m)   Wt 213 lb (96.6 kg)   SpO2 99%   BMI 25.26 kg/m²   General:Alert, awake, Oriented x3  .comfortable in bed, No distress. Head: Atraumatic , Normocephalic   Eyes: PERRL. No sclera icterus. No conjunctival injection. No discharge   ENT: No nasal  discharge. Pharynx clear. Neck:  Trachea midline. No thyromegaly, no JVD, No cervical adenopathy. Chest : Bilaterally symmetrical ,Normal effort,  No accessory muscle use  Lung : Diminished BS bilateraly. Right basilar Rales. No wheezing. No rhonchi. Heart[de-identified] Normal  rate. Regular rhythm. No mumur ,  Rub or gallop  ABD: Non-tender. Non-distended. No masses.  No organmegaly. Normal bowel sounds. No hernia. Extremities: No pitting in both lower leg , No Cyanosis ,No clubbing  Neuro: no cranial nerve abnormality, normal reflex and sensation, no focal weakness   Skin: Warm and dry. No erythema rash on exposed extremities. Data Review  Recent Labs     12/01/20  0649   WBC 4.9   HGB 8.7*   HCT 26.2*   *      Recent Labs     12/01/20  0649   *   K 5.3*      CO2 20   BUN 44*   CREATININE 2.49*   GLUCOSE 146*       MV Settings: ABGs: No results for input(s): PHART, NUL9YQH, PO2ART, OCM9VOG, BEART, I3UXFQFH, ZZJ2XUZ in the last 72 hours. O2 Device: None (Room air)  No results found for: [address]    Radiology  Xr Chest Portable    Result Date: 12/1/2020  EXAMINATION: XR CHEST PORTABLE. DATE AND TIME:12/1/2020 6:03 AM CLINICAL HISTORY: SHORTNESS OF BREATH   SOB  COMPARISONS: FEBRUARY 19, 2019  FINDINGS: Right-sided pleural thickening/effusion with right base infiltrate/atelectasis. Partially visualized thoracic external fixation rods. Left lung clear. No pneumothorax. RIGHT-SIDED PLEURAL PARENCHYMAL CHANGES, AGE INDETERMINATE. Assessment and  plan:     1. Community-acquired pneumonia  2. Combined systolic and diastolic congestive heart failure  3. Hypertension  4. Hyperlipidemia  5. COVID-19, ruled out    Patient is currently on Rocephin 1 g every 24 hourly and Zithromax 500 mg every 24 hourly. He is on room air and his O2 saturation 99%. He has complaint of shortness of breath and dry cough. Chest x-ray shows right basilar atelectasis and infiltration with pleural thickening. He has 1 Covid test at [facility] ER negative and 1 test done here is negative.   Continue present treatment plan    Thank you for consult  Electronically signed by Arlin Avitia MD, FCCP on 12/1/2020 at 4:57 PM

## 2020-12-01 NOTE — CONSULTS
Consults    Patient Name: Jamison Fairchild  Admit Date: 2020  5:04 AM  MR #: [id number]  : 1935    Attending Physician: Sweetie Marcial DO  Reason for consult: CHF    History of Presenting Illness:      Jamison Fairchild is a 82 y.o. male on hospital day 0 with a history of . History Obtained From:  patient, electronic medical record    Pt has been ill for 3-4 weeks. Mostly fatigue SOB Cough and decreased appetite. Denies fevers. No CP. He was transferred from [address] [place] [address] at [facility]. He has extensive CV history. Left Main and CX Ostium Stents last year prior to TAVR for Severfe calcific AS at [facility]. He then underwent Watchmen device and permanent pacin. Not clear why he did not receive ICD at [facility] as his LVEF is known to be severely depressed at 20-25% for prolonged period of time. HIs BNP is elevated but clinically he is not in CHF. History:      EKG:  Past Medical History:   Diagnosis Date    Combined systolic and diastolic congestive heart failure ([facility] 2019    Constipation x 2weeks 2014    Diverticulosis of sigmoid colon 2016    DR. MANZO    Hypercholesterolemia     LBP (low back pain) 2014    LBP (low back pain) 2014    OA (osteoarthritis)     Polyp, colonic 2016    DR. MANZO    [facility]     Smoking hx     SOB (shortness of breath) 2019    Tinea manuum 2015    Type II or unspecified type diabetes mellitus without mention of complication, not stated as uncontrolled      Past Surgical History:   Procedure Laterality Date    AORTIC VALVE REPLACEMENT  11/15/2019    DR Corina Guevara    CARDIAC CATHETERIZATION  2019    DR ALEJANDRA Massey CAROTID ENDARTERECTOMY  12    CATARACT [facility]AL WITH IMPLANT  13    [facility] DR[facility] GRIDER     COLONOSCOPY  10/18/2012    [facility]    UPPER GASTROINTESTINAL ENDOSCOPY  10/31/2017    DR MANZO    WISDOM TOOTH EXTRACTION         Family History  Family History Problem Relation Age of Onset    Diabetes Mother     Heart Disease Mother     Allergy (Severe) Mother      [] Unable to obtain due to ventilated and/ or neurologic status    Social History     Socioeconomic History    Marital status:      Spouse name: Not on file    Number of children: Not on file    Years of education: Not on file    Highest education level: Not on file   Occupational History    Not on file   Social Needs    Financial resource strain: Not on file    Food insecurity     Worry: Not on file     Inability: Not on file    Transportation needs     Medical: Not on file     Non-medical: Not on file   Tobacco Use    Smoking status: Former Smoker     Packs/day: 0.50     Years: 70.00     Pack years: 35.00     Types: Cigarettes    Smokeless tobacco: Never Used   Substance and Sexual Activity    Alcohol use: Yes    Drug use: No    Sexual activity: Yes     Partners: Female   Lifestyle    Physical activity     Days per week: Not on file     Minutes per session: Not on file    Stress: Not on file   Relationships    Social connections     Talks on phone: Not on file     Gets together: Not on file     Attends Mormon service: Not on file     Active member of club or organization: Not on file     Attends meetings of clubs or organizations: Not on file     Relationship status: Not on file    Intimate partner violence     Fear of current or ex partner: Not on file     Emotionally abused: Not on file     Physically abused: Not on file     Forced sexual activity: Not on file   Other Topics Concern    Not on file   Social History Narrative    Not on file      [] Unable to obtain due to ventilated and/ or neurologic status      Home Medications:      Medications Prior to Admission: Coenzyme Q10 (CO Q-10) 100 MG CAPS, Take 1 capsule by mouth daily  HYDROcodone-acetaminophen (NORCO) 5-325 MG per tablet, Take 1 tablet by mouth every 6 hours as needed for Pain.   spironolactone (ALDACTONE) 25 MG tablet, Take 12.5 mg by mouth daily  midodrine (PROAMATINE) 2.5 MG tablet, Take 2.5 mg by mouth daily  carvedilol (COREG) 3.125 MG tablet, Take 3.125 mg by mouth daily Hold if SBP <100  ENTRESTO 24-26 MG per tablet, 1 tablet 2 times daily   clopidogrel (PLAVIX) 75 MG tablet, TAKE ONE TABLET BY MOUTH EVERY MORNING  furosemide (LASIX) 40 MG tablet, Take 1 tablet by mouth 2 times daily (Patient taking differently: Take 20 mg by mouth daily )  atorvastatin (LIPITOR) 10 MG tablet, TAKE ONE TABLET BY MOUTH EVERY DAY  ferrous sulfate 325 (65 Fe) MG tablet, Take 325 mg by mouth daily (with breakfast)   vitamin B-12 (CYANOCOBALAMIN) 1000 MCG tablet, Take 1,000 mcg by mouth daily   tamsulosin (FLOMAX) 0.4 MG capsule, Take 1 capsule by mouth nightly  esomeprazole (NEXIUM) 40 MG delayed release capsule, TAKE ONE CAPSULE BY MOUTH EVERY MORNING BEFORE BREAKFAST (Patient taking differently: Take 40 mg by mouth every other day )  finasteride (PROSCAR) 5 MG tablet, TAKE ONE TABLET BY MOUTH EVERY DAY  [DISCONTINUED] rivaroxaban (XARELTO) 15 MG TABS tablet, Take 1 tablet by mouth daily (with breakfast)  [DISCONTINUED] metOLazone (ZAROXOLYN) 10 MG tablet, Take 10 mg by mouth daily  [DISCONTINUED] lidocaine (LMX) 4 % cream, Apply a half dollar sized amount to intact skin topically up to twice daily as needed for pain  [DISCONTINUED] gabapentin (NEURONTIN) 100 MG capsule, Take 1 capsule by mouth every evening for 30 days.   [DISCONTINUED] metoprolol succinate (TOPROL XL) 50 MG extended release tablet, Take 1 tablet by mouth 2 times daily (Patient taking differently: Take 25 mg by mouth daily )  [DISCONTINUED] albuterol sulfate  (90 Base) MCG/ACT inhaler, Inhale 2 puffs into the lungs every 6 hours as needed for Wheezing    Current Hospital Medications:     Scheduled Meds:   sodium chloride flush  10 mL Intravenous 2 times per day    enoxaparin  40 mg Subcutaneous Daily    azithromycin  500 mg Intravenous Q24H    And    for allowing us to participate in the care of this patient. Will continue to follow. Please call if questions or concerns arise.     Electronically signed by Emili Ingram MD on 12/1/2020 at 8:38 AM

## 2020-12-02 LAB
LV EF: 40 %
LVEF MODALITY: NORMAL

## 2020-12-02 PROCEDURE — 6360000002 HC RX W HCPCS: Performed by: FAMILY MEDICINE

## 2020-12-02 PROCEDURE — 2580000003 HC RX 258: Performed by: FAMILY MEDICINE

## 2020-12-02 PROCEDURE — 99232 SBSQ HOSP IP/OBS MODERATE 35: CPT | Performed by: INTERNAL MEDICINE

## 2020-12-02 PROCEDURE — 93306 TTE W/DOPPLER COMPLETE: CPT

## 2020-12-02 PROCEDURE — 97162 PT EVAL MOD COMPLEX 30 MIN: CPT

## 2020-12-02 PROCEDURE — 6370000000 HC RX 637 (ALT 250 FOR IP): Performed by: INTERNAL MEDICINE

## 2020-12-02 PROCEDURE — 97166 OT EVAL MOD COMPLEX 45 MIN: CPT

## 2020-12-02 PROCEDURE — 1210000000 HC MED SURG R&B

## 2020-12-02 RX ORDER — MIDODRINE HYDROCHLORIDE 2.5 MG/1
2.5 TABLET ORAL 2 TIMES DAILY WITH MEALS
Status: DISCONTINUED | OUTPATIENT
Start: 2020-12-02 | End: 2020-12-04

## 2020-12-02 RX ORDER — CARVEDILOL 3.12 MG/1
3.12 TABLET ORAL 2 TIMES DAILY
Status: DISCONTINUED | OUTPATIENT
Start: 2020-12-02 | End: 2020-12-04 | Stop reason: HOSPADM

## 2020-12-02 RX ADMIN — AZITHROMYCIN 500 MG: 500 INJECTION, POWDER, LYOPHILIZED, FOR SOLUTION INTRAVENOUS at 18:51

## 2020-12-02 RX ADMIN — SACUBITRIL AND VALSARTAN 1 TABLET: 24; 26 TABLET, FILM COATED ORAL at 10:13

## 2020-12-02 RX ADMIN — CARVEDILOL 3.12 MG: 3.12 TABLET, FILM COATED ORAL at 10:06

## 2020-12-02 RX ADMIN — ENOXAPARIN SODIUM 40 MG: 40 INJECTION SUBCUTANEOUS at 10:04

## 2020-12-02 RX ADMIN — Medication 10 ML: at 10:12

## 2020-12-02 RX ADMIN — CARBIDOPA AND LEVODOPA 2.5 MG: 50; 200 TABLET, EXTENDED RELEASE ORAL at 10:06

## 2020-12-02 RX ADMIN — CARBIDOPA AND LEVODOPA 2.5 MG: 50; 200 TABLET, EXTENDED RELEASE ORAL at 18:14

## 2020-12-02 RX ADMIN — CLOPIDOGREL BISULFATE 75 MG: 75 TABLET, FILM COATED ORAL at 10:04

## 2020-12-02 RX ADMIN — SACUBITRIL AND VALSARTAN 1 TABLET: 24; 26 TABLET, FILM COATED ORAL at 20:41

## 2020-12-02 RX ADMIN — CEFTRIAXONE SODIUM 1 G: 1 INJECTION, POWDER, FOR SOLUTION INTRAMUSCULAR; INTRAVENOUS at 18:14

## 2020-12-02 RX ADMIN — ATORVASTATIN CALCIUM 10 MG: 10 TABLET, FILM COATED ORAL at 20:41

## 2020-12-02 RX ADMIN — Medication 10 ML: at 20:42

## 2020-12-02 ASSESSMENT — ENCOUNTER SYMPTOMS
BLOOD IN STOOL: 0
GASTROINTESTINAL NEGATIVE: 1
CHEST TIGHTNESS: 0
EYES NEGATIVE: 1
COUGH: 1
STRIDOR: 0
SHORTNESS OF BREATH: 1
NAUSEA: 0
WHEEZING: 0

## 2020-12-02 ASSESSMENT — PAIN SCALES - GENERAL
PAINLEVEL_OUTOF10: 0
PAINLEVEL_OUTOF10: 0

## 2020-12-02 NOTE — PROGRESS NOTES
Physician Progress Note      PATIENT:               [facility] #:                  [id number]  :                       1935  ADMIT DATE:       2020 5:04 AM  DISCH DATE:  RESPONDING  PROVIDER #:        Chucky Jackman DO          QUERY TEXT:    Dear Attending Physician:    Patient admitted with PNA, noted to have CKD. Please document in progress   notes and discharge summary if you are evaluating and/or treating any of the   following: The medical record reflects the following:  Risk Factors: chronic combined CHF  Clinical Indicators:   Dr. Carolyn Hernandez: CKD  BUN/Cr/GFR  20  44/2.49/24.9  19  28/1.46/46.3  19  23/1.30/52.9  18  24/1.32/52.0  Treatment: monitoring  Options provided:  -- CKD Stage 3a GFR 45-59  -- CKD Stage 3b GFR 30-44  -- CKD Stage 4 GFR 15-29  -- Other - I will add my own diagnosis  -- Disagree - Not applicable / Not valid  -- Disagree - Clinically unable to determine / Unknown  -- Refer to Clinical Documentation Reviewer    PROVIDER RESPONSE TEXT:    This patient has CKD Stage 3a.     Query created by: Aline Cota on 2020 12:27 PM      Electronically signed by:  Chucky Jackman DO 2020 3:04 PM

## 2020-12-02 NOTE — PROGRESS NOTES
uncontrolled      Past Surgical History:   Procedure Laterality Date    AORTIC VALVE REPLACEMENT  11/15/2019    DR Cabrera Perham CARDIAC CATHETERIZATION  06/13/2019    DR ALEJANDRA Espinal CAROTID ENDARTERECTOMY  8/2/12    CATARACT REMOVAL WITH IMPLANT  9/9/13    [facility] DR. GRIDER     COLONOSCOPY  10/18/2012    [facility]    UPPER GASTROINTESTINAL ENDOSCOPY  10/31/2017    DR Anca Espinal WISDOM TOOTH EXTRACTION  1952        Restrictions  Restrictions/Precautions: Fall Risk     Safety Devices: Safety Devices  Safety Devices in place: Yes  Type of devices:  All fall risk precautions in place   Initially in place: No    Subjective  Pre Treatment Pain Screening  Pain at present: 0  Scale Used: Numeric Score  Intervention List: Patient able to continue with treatment    Pain Reassessment:   Pain Assessment  Patient Currently in Pain: Denies  Pain Assessment: 0-10  Pain Level: 0       Prior Level of Function:  Social/Functional History  Lives With: Spouse  Type of Home: House  Home Layout: Two level  Home Access: Stairs to enter with rails  Entrance Stairs - Number of Steps: 1  Bathroom Shower/Tub: Tub/Shower unit, Walk-in shower  Bathroom Equipment: Grab bars in shower, Shower chair  Home Equipment: 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  Leisure & Hobbies: read, watch movies    OBJECTIVE:     Orientation Status:  Orientation  Overall Orientation Status: Within Functional Limits    Observation:  Observation/Palpation  Posture: Fair  Observation: SOB, RA, agreeable to OT evaluation    Cognition Status:  Cognition  Overall Cognitive Status: Exceptions  Arousal/Alertness: Appropriate responses to stimuli  Following Commands: Follows one step commands with repetition  Attention Span: Appears intact  Memory: Appears intact  Safety Judgement: Decreased awareness of need for safety, Decreased awareness of need for assistance  Problem Solving: Assistance required to identify errors made, Assistance required to implement solutions, Assistance required to generate solutions, Assistance required to correct errors made  Insights: Decreased awareness of deficits  Initiation: Does not require cues  Sequencing: Does not require cues    Perception Status:  Perception  Overall Perceptual Status: WFL    Sensation Status:  Sensation  Overall Sensation Status: Impaired    Vision and Hearing Status:  Vision  Vision: Impaired  Vision Exceptions: Wears glasses for reading  Hearing  Hearing: Within functional limits     ROM:   LUE AROM (degrees)  LUE AROM : WFL  Left Hand AROM (degrees)  Left Hand AROM: WFL  RUE AROM (degrees)  RUE AROM : WFL  Right Hand AROM (degrees)  Right Hand AROM: WFL    Strength:  LUE Strength  Gross LUE Strength: Exceptions to L  L Hand General: 3/5  LUE Strength Comment: 3+/5 all planes  RUE Strength  Gross RUE Strength: Exceptions to [facility]  R Hand General: 3/5  RUE Strength Comment: 3+/5 all planes    Coordination, Tone, Quality of Movement:    Tone RUE  RUE Tone: Normotonic  Tone LUE  LUE Tone: Normotonic  Coordination  Coordination and Movement description: Fine motor impairments, Decreased accuracy, Right UE, Left UE    Hand Dominance:  Hand Dominance  Hand Dominance: Right    ADL Status:  ADL  Feeding: Setup  Grooming: Supervision  UE Bathing: Stand by assistance  LE Bathing: Contact guard assistance  UE Dressing: Stand by assistance  LE Dressing: Minimal assistance  Toileting: Contact guard assistance  Additional Comments: Simulated all ADLs as above  Toilet Transfers  Toilet - Technique: Ambulating  Equipment Used: Grab bars  Toilet Transfer: Contact guard assistance       Therapy key for assistance levels -   Independent = Pt. is able to perform task with no assistance but may require a device   Stand by assistance = Pt. does not perform task at an independent level but does not need physical assistance, requires verbal cues  Minimal, Moderate, Maximal Assistance = Pt. requires physical assistance (25%, 50%, 75% assist from helper) for task but is able to actively participate in task   Dependent = Pt. requires total assistance with task and is not able to actively participate with task completion     Functional Mobility:  Functional Mobility  Functional - Mobility Device: Rolling Walker  Activity: To/from bathroom  Assist Level: Contact guard assistance  Transfers  Sit to stand: Contact guard assistance  Stand to sit: Contact guard assistance    Bed Mobility  Bed mobility  Supine to Sit: Stand by assistance  Sit to Supine: Stand by assistance    Seated and Standing Balance:  Balance  Sitting Balance: Supervision  Standing Balance: Contact guard assistance    Functional Endurance:  Activity Tolerance  Activity Tolerance: Patient limited by fatigue  Activity Tolerance: fair    D/C Recommendations:  OT D/C RECOMMENDATIONS  REQUIRES OT FOLLOW UP: Yes    Equipment Recommendations:  OT Equipment Recommendations  Other: continue to assess    OT Education:   OT Education  OT Education: OT Role, Plan of Care    OT Follow Up:  OT D/C RECOMMENDATIONS  REQUIRES OT FOLLOW UP: Yes       Assessment/Discharge Disposition:  Assessment: Pt is an 81 y/o male from home who presents to [facility] with the above deficits which impacts his independence and safety during ADLs. Pt would benefit from OT services to maximize independence and safety during ADL tasks.   Performance deficits / Impairments: Decreased functional mobility , Decreased strength, Decreased endurance, Decreased fine motor control, Decreased safe awareness, Decreased ADL status, Decreased high-level IADLs, Decreased cognition, Decreased balance  Prognosis: Good  Discharge Recommendations: Continue to assess pending progress  Decision Making: Medium Complexity  History: Pt's medical history is moderately complex  Exam: 9 perf deficits  Assistance / Modification: CGA    Six Click Score   How much help for putting on and taking off regular lower body clothing?: A Little  How much help for Bathing?: A Little  How much help for Toileting?: A Little  How much help for putting on and taking off regular upper body clothing?: A Little  How much help for taking care of personal grooming?: A Little  How much help for eating meals?: A Little  AM-[facility] Inpatient Daily Activity Raw Score: 18  AM-[facility] Inpatient ADL T-Scale Score : 38.66  ADL Inpatient CMS 0-100% Score: 46.65    Plan:  Plan  Times per week: 1-3x  Plan weeks: length of acute stay  Current Treatment Recommendations: Strengthening, Balance Training, Functional Mobility Training, Cognitive/Perceptual Training, Self-Care / ADL, Safety Education & Training, Neuromuscular Re-education, Endurance Training, Equipment Evaluation, Education, & procurement, Home Management Training    Goals:   Patient will:    - Improve functional endurance to tolerate/complete 60 mins of ADL's  - Be Independent in UB ADLs   - Be Independent in LB ADLs  - Be Independent in ADL transfers without LOB  - Be Independent in toileting tasks  - Improve B hand fine motor coordination to [facility] in order to manage clothing fasteners/self-care containers in a timely manner  - Improve B UE strength and endurance to [facility] in order to participate in self-care activities as projected. - Access appropriate D/C site with as few architectural barriers as possible.   - Sequence self-care tasks with no verbal cues    Patient Goal: Patient goals : \"to get back to where I've been\"     Discussed and agreed upon: Yes Comments:     Therapy Time:   OT Individual Minutes  Time In: 1305  Time Out: Azeb  Minutes: 17    Eval: 17 minutes     Electronically signed by:    Tor Reyes TONIA Morrison/L  12/2/2020, 1:49 PM

## 2020-12-02 NOTE — PROGRESS NOTES
Physical Therapy Med Surg Initial Assessment  Facility/Department: [facility] MED SURG UNIT  [facility]       NAME: Chuck Peterson  : 1935 (45 y.o.)  MRN: [id number]  CODE STATUS: Full Code    Date of Service: 2020    Patient Diagnosis(es): Pneumonia [J18.9]   No chief complaint on file. Patient Active Problem List    Diagnosis Date Noted    Pneumonia 2020    S/P peripheral artery angioplasty 2019    Combined systolic and diastolic congestive heart failure ([facility] 2019    SOB (shortness of breath) 2019    [facility]    Abnormality of gait 2017    Abdominal wall pain in right lower quadrant 2017    Postlaminectomy syndrome, lumbar 2017    VT (ventricular tachycardia) ([facility] 2016    Left carotid artery stenosis 2016    Atherosclerosis of native coronary artery of native heart without angina pectoris 2016    Chronic stable angina ([facility] 2016    Abdominal wall bulge 2016    Pre-diabetes 2016    Tinea manuum 2015    After-cataract, obscuring vision 2015    Swollen abdomen 2014    OA (osteoarthritis of spine) 2014    Low back pain 2014    Constipation x 2weeks 2014    Retinal embolus, right eye 2013    Trigger finger, acquired 05/15/2012    Smoking hx     Hypercholesterolemia         Past Medical History:   Diagnosis Date    Combined systolic and diastolic congestive heart failure ([facility] 2019    Constipation x 2weeks 2014    Diverticulosis of sigmoid colon 2016    DR. MANZO    Hypercholesterolemia     LBP (low back pain) 2014    LBP (low back pain) 2014    OA (osteoarthritis)     Polyp, colonic 2016    DR. MANZO    Prostate cancer ([facility]     Smoking hx     SOB (shortness of breath) 2019    Tinea manuum 2015    Type II or unspecified type diabetes mellitus without mention of complication, not stated as uncontrolled      Past Surgical History:   Procedure Laterality Date    AORTIC VALVE REPLACEMENT  11/15/2019    DR Elonda Koyanagi CARDIAC CATHETERIZATION  06/13/2019    DR ROBERTS   [facility] CAROTID ENDARTERECTOMY  8/2/12    CATARACT REMOVAL WITH IMPLANT  9/9/13    [facility] DR. GRIDER     COLONOSCOPY  10/18/2012    [facility]    [facility]    UPPER GASTROINTESTINAL ENDOSCOPY  10/31/2017    DR MANZO   [facility] WISDOM TOOTH EXTRACTION  1950       Chart Reviewed: Yes  Patient assessed for rehabilitation services?: Yes  Family / Caregiver Present: No    Restrictions:  Restrictions/Precautions: Fall Risk     SUBJECTIVE:      Pain  Pre Treatment Pain Screening  Comments / Details: denies    Post Treatment Pain Screening:   Pain Assessment  Pain Assessment: (denies)    Prior Level of Function:  Social/Functional History  Lives With: Spouse  Type of Home: House  Home Layout: Two level  Home Access: Stairs to enter with rails  Entrance Stairs - Number of Steps: 1  Bathroom Shower/Tub: Tub/Shower unit, Walk-in shower  Bathroom Equipment: Grab bars in shower, Shower chair  Home Equipment: Michael Snell, Rolling walker, 4 wheeled walker  Receives Help From: Family  ADL Assistance: Independent  Homemaking Assistance: Independent  Homemaking Responsibilities: No(cleaning lady 3x a week)  Ambulation Assistance: Independent  Transfer Assistance: Independent  Active : Yes  Mode of Transportation: Car  Occupation: Retired  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 Pleasant and motivated.     ROM:  RLE PROM: WFL  LLE PROM: WFL  Spine  Cervical: forward head posture    Strength:  Strength RLE  Strength RLE: WFL  Strength LLE  Strength LLE: WFL    Neuro:  Balance  Sitting - Static: Good  Sitting - Dynamic: Good  Standing - Static: Fair  Standing - Dynamic: Fair     Motor Control  Gross Motor?: WFL  Sensation  Overall Sensation Status: WFL    Bed mobility  Supine to Sit: Stand by assistance  Sit to Supine: Stand by assistance  Comment: increased effort to complete    Transfers  Sit to Stand: Stand by assistance  Stand to sit: Stand by assistance  Bed to Chair: Stand by assistance  Comment: increased effort to complete    Ambulation  Ambulation?: Yes  Ambulation 1  Surface: level tile  Device: Rolling Walker  Assistance: Stand by assistance  Quality of Gait: Shuffling steps with FF posture  Distance: 25ft X 2    Stairs/Curb  Stairs?: No         Activity Tolerance  Activity Tolerance: Patient Tolerated treatment well  Activity Tolerance: Pt with increased SOB following activities, however SaO2 99% on RA following. PT Education  PT Education: PT Role;Plan of Care;Goals    ASSESSMENT:   Body structures, Functions, Activity limitations: Decreased functional mobility ; Decreased strength;Decreased endurance;Decreased ADL status  Decision Making: Medium Complexity  History: Med  Exam: Med  Clinical Presentation: Med    Prognosis: Good  Barriers to Learning: none at thist kaelyn    DISCHARGE RECOMMENDATIONS:  Discharge Recommendations: Continue to assess pending progress, Patient would benefit from continued therapy after discharge    Assessment: Continued PT indicated to progress mobiltiy and facilitate DC at highest level of indep and safety.   REQUIRES PT FOLLOW UP: Yes      PLAN OF CARE:  Plan  Times per week: 3-6  Current Treatment Recommendations: Strengthening, Balance Training, ADL/Self-care Training, Transfer Training, Functional Mobility Training, Gait Training, Stair training, Endurance Training, Neuromuscular Re-education, Home Exercise Program, Safety Education & Training, Patient/Caregiver Education & Training, Equipment Evaluation, Education, & procurement  Safety Devices  Type of devices: All fall risk precautions in place    Goals:  Short term goals  Short term goal 1: Pt to complete HEP with indep (sink exes)  Short term goal 2: Pt to tolerate 5min standing activities without increased SOB  Long term goals  Long term goal 1: Pt to complete bed mobility with indep  Long term goal 2: Pt to complete transfers with indep  Long term goal 3: Pt to ambulate 150ft with LRD and indep  Long term goal 4: Pt to manage flight of steps with HR and indep    AMPAC (6 CLICK) BASIC MOBILITY  AM-PAC Inpatient Mobility Raw Score : 20     Therapy Time:   Individual   Time In 1433   Time Out 1445   Minutes Usman [address], [address] [facility], 12/02/20 at 3:45 PM         Definitions for assistance levels  Independent = pt does not require any physical supervision or assistance from another person for activity completion. Device may be needed.   Stand by assistance = pt requires verbal cues or instructions from another person, close to but not touching, to perform the activity  Minimal assistance= pt performs 75% or more of the activity; assistance is required to complete the activity  Moderate assistance= pt performs 50% of the activity; assistance is required to complete the activity  Maximal assistance = pt performs 25% of the activity; assistance is required to complete the activity  Dependent = pt requires total physical assistance to accomplish the task

## 2020-12-02 NOTE — PROGRESS NOTES
Department of Internal Medicine  General Internal Medicine  Attending Progress Note      SUBJECTIVE:  Pt seen and examined. Pt off O2 today, but gets very sob with exertion and feels very weak    OBJECTIVE      Medications    Current Facility-Administered Medications: carvedilol (COREG) tablet 3.125 mg, 3.125 mg, Oral, BID  midodrine (PROAMATINE) tablet 2.5 mg, 2.5 mg, Oral, BID WC  sacubitril-valsartan (ENTRESTO) 24-26 MG per tablet 1 tablet, 1 tablet, Oral, BID  sodium chloride flush 0.9 % injection 10 mL, 10 mL, Intravenous, 2 times per day  sodium chloride flush 0.9 % injection 10 mL, 10 mL, Intravenous, PRN  acetaminophen (TYLENOL) tablet 650 mg, 650 mg, Oral, Q6H PRN **OR** acetaminophen (TYLENOL) suppository 650 mg, 650 mg, Rectal, Q6H PRN  polyethylene glycol (GLYCOLAX) packet 17 g, 17 g, Oral, Daily PRN  promethazine (PHENERGAN) tablet 12.5 mg, 12.5 mg, Oral, Q6H PRN **OR** ondansetron (ZOFRAN) injection 4 mg, 4 mg, Intravenous, Q6H PRN  enoxaparin (LOVENOX) injection 40 mg, 40 mg, Subcutaneous, Daily  azithromycin (ZITHROMAX) 500 mg in D5W 250ml addavial, 500 mg, Intravenous, Q24H **AND** cefTRIAXone (ROCEPHIN) 1 g IVPB in 50 mL D5W minibag, 1 g, Intravenous, Q24H  clopidogrel (PLAVIX) tablet 75 mg, 75 mg, Oral, Daily  atorvastatin (LIPITOR) tablet 10 mg, 10 mg, Oral, Nightly  ALPRAZolam (XANAX) tablet 0.25 mg, 0.25 mg, Oral, TID PRN  Physical    VITALS:  /60   Pulse 99   Temp 97.7 °F (36.5 °C) (Oral)   Resp 18   Ht 6' 5\" (1.956 m)   Wt 213 lb (96.6 kg)   SpO2 97%   BMI 25.26 kg/m²   Constitutional: Awake and alert in no acute distress.  Lying in bed comfortably  Head: Normocephalic, atraumatic  Eyes: EOMI, PERRLA  ENT: moist mucous membranes  Neck: neck supple, trachea midline  Lungs: Good inspiratory effort, no wheeze, no rhonchi, no rales  Heart: RRR, normal S1 and S2  GI: Soft, non-distended, non tender, no guarding, no rebound, +BS  MSK: no edema noted  Skin: warm, dry  Psych: appropriate

## 2020-12-02 NOTE — PROGRESS NOTES
INPATIENT PROGRESS NOTES    PATIENT NAME: Michael Ulrich  MRN: [id number]  SERVICE DATE:  December 2, 2020   SERVICE TIME:  6:07 PM      PRIMARY SERVICE: Pulmonary Disease    CHIEF COMPLAIN: Shortness of breath    INTERVAL HPI: Patient seen and examined at bedside, Interval Notes, orders reviewed. Nursing notes noted  Patient said he is feeling okay at rest but when he walks he gets very short of breath. He is also feeling weak. COVID-19 ruled out. He has no chest pain. He has no fever. No nausea vomiting or diarrhea. OBJECTIVE    Body mass index is 25.26 kg/m². PHYSICAL EXAM:  Vitals:  /60   Pulse 99   Temp 97.7 °F (36.5 °C) (Oral)   Resp 18   Ht 6' 5\" (1.956 m)   Wt 213 lb (96.6 kg)   SpO2 97%   BMI 25.26 kg/m²   General:Alert, awake . comfortable in bed, No distress. Head: Atraumatic , Normocephalic   Eyes: PERRL. No sclera icterus. No conjunctival injection. No discharge   ENT: No nasal  discharge. Pharynx clear. lips, teeth, mucosa and gums are normal, tongue protrudes in the midline  Neck:  Trachea midline. No thyromegaly, no JVD, No cervical adenopathy. Chest : Bilaterally symmetrical ,Normal effort,  No accessory muscle use  Lung : . Fair BS bilateral, decreased BS at bases. Few right basilar Rales. No wheezing. No rhonchi. Heart[de-identified] Normal  rate. Regular rhythm. No mumur ,  Rub or gallop  ABD: Non-tender. Non-distended. No masses. No organmegaly. Normal bowel sounds. No hernia. Ext : No Pitting both leg , No Cyanosis No clubbing  Neuro: no focal weakness          DATA:   Recent Labs     12/01/20  0649   WBC 4.9   HGB 8.7*   HCT 26.2*   .9*   *     Recent Labs     12/01/20  0649   *   K 5.3*      CO2 20   BUN 44*   CREATININE 2.49*   GLUCOSE 146*   CALCIUM 8.8   LABGLOM 24.9*   GFRAA 30.1*       MV Settings:          No results for input(s): PHART, NWQ9YLD, PO2ART, AUR5OZX, BEART, U8ZRGRMH in the last 72 hours.     O2 Device: None (Room air)    DIET GENERAL; No Added Salt (3-4 GM)     MEDICATIONS during current hospitalization:    Continuous Infusions:    Scheduled Meds:   carvedilol  3.125 mg Oral BID    midodrine  2.5 mg Oral BID WC    sacubitril-valsartan  1 tablet Oral BID    sodium chloride flush  10 mL Intravenous 2 times per day    enoxaparin  40 mg Subcutaneous Daily    azithromycin  500 mg Intravenous Q24H    And    cefTRIAXone (ROCEPHIN) IV  1 g Intravenous Q24H    clopidogrel  75 mg Oral Daily    atorvastatin  10 mg Oral Nightly       PRN Meds:sodium chloride flush, acetaminophen **OR** acetaminophen, polyethylene glycol, promethazine **OR** ondansetron, ALPRAZolam    Radiology  Xr Chest Portable    Result Date: 12/1/2020  EXAMINATION: XR CHEST PORTABLE. DATE AND TIME:12/1/2020 6:03 AM CLINICAL HISTORY: SHORTNESS OF BREATH   SOB  COMPARISONS: FEBRUARY 19, 2019  FINDINGS: Right-sided pleural thickening/effusion with right base infiltrate/atelectasis. Partially visualized thoracic external fixation rods. Left lung clear. No pneumothorax. RIGHT-SIDED PLEURAL PARENCHYMAL CHANGES, AGE INDETERMINATE. IMPRESSION AND SUGGESTION:  1. Community-acquired pneumonia  2. Combined systolic and diastolic congestive heart failure  3. Hypertension  4. Hyperlipidemia  5. COVID-19, ruled out    He is currently on Rocephin 1 g every 24 hourly and Zithromax 500 mg every 24 hourly. Room air O2 saturation 97%. Chest x-ray shows right-sided pleural parenchymal changes.   Continue present treatment plan      Electronically signed by Iman Segura MD, FCCP on 12/2/2020 at 6:07 PM

## 2020-12-02 NOTE — PROGRESS NOTES
Progress Note  Patient: Jade Loera  Unit/Bed: T631/L847-29  YOB: 1935  MRN: [id number]  Acct: [de-identified]   Admitting Diagnosis: Pneumonia [J18.9]  Admit Date:  12/1/2020  Hospital Day: 1    Chief Complaint: CAP    Histories:  Past Medical History:   Diagnosis Date    [facility] 2/21/2019    Constipation x 2weeks 6/18/2014    Diverticulosis of sigmoid colon 02/03/2016    DR. MANZO    Hypercholesterolemia     LBP (low back pain) 6/18/2014    LBP (low back pain) 6/18/2014    OA (osteoarthritis)     Polyp, colonic 02/03/2016    DR. MANZO    Prostate cancer ([facility]     Smoking hx     SOB (shortness of breath) 2/20/2019    Tinea manuum 7/22/2015    Type II or unspecified type diabetes mellitus without mention of complication, not stated as uncontrolled      Past Surgical History:   Procedure Laterality Date    AORTIC VALVE REPLACEMENT  11/15/2019    DR Patrizia Barkley    CARDIAC CATHETERIZATION  06/13/2019    DR ALEJANDRA Chopra CAROTID ENDARTERECTOMY  8/2/12    CATARACT REMOVAL WITH IMPLANT  9/9/13    [facility] DR. GRIDER     COLONOSCOPY  10/18/2012    [facility]    UPPER GASTROINTESTINAL ENDOSCOPY  10/31/2017    DR ANAIS Chopra WISDOM TOOTH EXTRACTION  1952     Family History   Problem Relation Age of Onset    Diabetes Mother     Heart Disease Mother     Allergy (Severe) Mother      Social History     Socioeconomic History    Marital status:      Spouse name: None    Number of children: None    Years of education: None    Highest education level: None   Occupational History    None   Social Needs    Financial resource strain: None    Food insecurity     Worry: None     Inability: None    Transportation needs     Medical: None     Non-medical: None   Tobacco Use    Smoking status: Former Smoker     Packs/day: 0.50     Years: 70.00     Pack years: 35.00     Types: Cigarettes    Smokeless tobacco: Never Used   Substance and Sexual Activity    Alcohol use: Yes    Drug use: No    Sexual activity: Yes     Partners: Female   Lifestyle    Physical activity     Days per week: None     Minutes per session: None    Stress: None   Relationships    Social connections     Talks on phone: None     Gets together: None     Attends [employer] service: None     Active member of club or organization: None     Attends meetings of clubs or organizations: None     Relationship status: None    Intimate partner violence     Fear of current or ex partner: None     Emotionally abused: None     Physically abused: None     Forced sexual activity: None   Other Topics Concern    None   Social History Narrative    None       Subjective/HPI feels ok at rest but SOB with walking. Coughed a lot last pm productive of clear sputum. No Fever. EKG:  with occ V Pacing. Review of Systems:   Review of Systems   Constitutional: Negative. Negative for diaphoresis and fatigue. HENT: Negative. Eyes: Negative. Respiratory: Positive for cough and shortness of breath. Negative for chest tightness, wheezing and stridor. Cardiovascular: Negative. Negative for chest pain, palpitations and leg swelling. Gastrointestinal: Negative. Negative for blood in stool and nausea. Genitourinary: Negative. Musculoskeletal: Negative. Skin: Negative. Neurological: Positive for weakness. Negative for dizziness, syncope and light-headedness. Hematological: Negative. Psychiatric/Behavioral: Negative. Physical Examination:    BP 92/70   Pulse 81   Temp 97.7 °F (36.5 °C) (Oral)   Resp 18   Ht 6' 5\" (1.956 m)   Wt 213 lb (96.6 kg)   SpO2 97%   BMI 25.26 kg/m²    Physical Exam   Constitutional: He appears healthy. No distress. HENT:   Normal cephalic and Atraumatic   Eyes: Pupils are equal, round, and reactive to light. Neck: Normal range of motion and thyroid normal. Neck supple. No JVD present. No neck adenopathy.  No 03/26/2019    LABALBU 4.4 02/27/2012    CREATININE 2.49 12/01/2020    CALCIUM 8.8 12/01/2020    GFRAA 30.1 12/01/2020    LABGLOM 24.9 12/01/2020    GLUCOSE 146 12/01/2020    GLUCOSE 115 02/27/2012     Magnesium:  No results found for: MG  Troponin:  No results found for: [facility] Problems    Diagnosis Date Noted    Pneumonia [J18.9] 12/01/2020     Priority: Low        Assessment/Plan:  1. CAP- on iv ABX. 2. COVID x2 negative   3. BNP elevated due to CMP. Pt not in CHF clinically  4. CAD- stable no angina  5. ICM EF 20-25 - will resume low dose BB and Entresto. along with Midodrine. 6. TAVR #34   7. AF- s/p Watchmen device. No need for A/C  8. PPM - again unclear as to why he did not receive ICD. Keep on Telemetry. 9. Resume Plavix and statin.    10. CKD  11. IS       Electronically signed by Corby Molina MD on 12/2/2020 at 9:36 AM

## 2020-12-02 NOTE — PROGRESS NOTES
[place]  Was anxious upon arrival  Talked with pt for a while  He is calm now and resting  93-96% on RA  Will continue to monitor

## 2020-12-03 LAB
ANION GAP SERPL CALCULATED.3IONS-SCNC: 13 MEQ/L (ref 9–15)
BUN BLDV-MCNC: 53 MG/DL (ref 8–23)
CALCIUM SERPL-MCNC: 9.2 MG/DL (ref 8.5–9.9)
CHLORIDE BLD-SCNC: 100 MEQ/L (ref 95–107)
CO2: 19 MEQ/L (ref 20–31)
CREAT SERPL-MCNC: 2.51 MG/DL (ref 0.7–1.2)
GFR AFRICAN AMERICAN: 29.8
GFR NON-AFRICAN AMERICAN: 24.7
GLUCOSE BLD-MCNC: 103 MG/DL (ref 70–99)
HCT VFR BLD CALC: 27.9 % (ref 42–52)
HEMOGLOBIN: 9 G/DL (ref 14–18)
MCH RBC QN AUTO: 33.4 PG (ref 27–31.3)
MCHC RBC AUTO-ENTMCNC: 32.4 % (ref 33–37)
MCV RBC AUTO: 102.9 FL (ref 80–100)
PDW BLD-RTO: 14.5 % (ref 11.5–14.5)
PLATELET # BLD: 124 K/UL (ref 130–400)
POTASSIUM REFLEX MAGNESIUM: 5.5 MEQ/L (ref 3.4–4.9)
RBC # BLD: 2.71 M/UL (ref 4.7–6.1)
SODIUM BLD-SCNC: 132 MEQ/L (ref 135–144)
WBC # BLD: 5.8 K/UL (ref 4.8–10.8)

## 2020-12-03 PROCEDURE — 99233 SBSQ HOSP IP/OBS HIGH 50: CPT | Performed by: INTERNAL MEDICINE

## 2020-12-03 PROCEDURE — 97116 GAIT TRAINING THERAPY: CPT

## 2020-12-03 PROCEDURE — 85027 COMPLETE CBC AUTOMATED: CPT

## 2020-12-03 PROCEDURE — 6370000000 HC RX 637 (ALT 250 FOR IP): Performed by: INTERNAL MEDICINE

## 2020-12-03 PROCEDURE — 36415 COLL VENOUS BLD VENIPUNCTURE: CPT

## 2020-12-03 PROCEDURE — 1210000000 HC MED SURG R&B

## 2020-12-03 PROCEDURE — 2580000003 HC RX 258: Performed by: FAMILY MEDICINE

## 2020-12-03 PROCEDURE — 99232 SBSQ HOSP IP/OBS MODERATE 35: CPT | Performed by: INTERNAL MEDICINE

## 2020-12-03 PROCEDURE — 97110 THERAPEUTIC EXERCISES: CPT

## 2020-12-03 PROCEDURE — 6360000002 HC RX W HCPCS: Performed by: FAMILY MEDICINE

## 2020-12-03 PROCEDURE — 80048 BASIC METABOLIC PNL TOTAL CA: CPT

## 2020-12-03 RX ADMIN — ENOXAPARIN SODIUM 40 MG: 40 INJECTION SUBCUTANEOUS at 08:22

## 2020-12-03 RX ADMIN — CARBIDOPA AND LEVODOPA 2.5 MG: 50; 200 TABLET, EXTENDED RELEASE ORAL at 08:22

## 2020-12-03 RX ADMIN — AZITHROMYCIN 500 MG: 500 INJECTION, POWDER, LYOPHILIZED, FOR SOLUTION INTRAVENOUS at 17:37

## 2020-12-03 RX ADMIN — ALPRAZOLAM 0.25 MG: 0.25 TABLET ORAL at 18:23

## 2020-12-03 RX ADMIN — CARBIDOPA AND LEVODOPA 2.5 MG: 50; 200 TABLET, EXTENDED RELEASE ORAL at 16:55

## 2020-12-03 RX ADMIN — SACUBITRIL AND VALSARTAN 1 TABLET: 24; 26 TABLET, FILM COATED ORAL at 08:22

## 2020-12-03 RX ADMIN — ATORVASTATIN CALCIUM 10 MG: 10 TABLET, FILM COATED ORAL at 20:31

## 2020-12-03 RX ADMIN — CEFTRIAXONE SODIUM 1 G: 1 INJECTION, POWDER, FOR SOLUTION INTRAMUSCULAR; INTRAVENOUS at 16:55

## 2020-12-03 RX ADMIN — SACUBITRIL AND VALSARTAN 1 TABLET: 24; 26 TABLET, FILM COATED ORAL at 20:31

## 2020-12-03 RX ADMIN — Medication 10 ML: at 20:32

## 2020-12-03 RX ADMIN — CLOPIDOGREL BISULFATE 75 MG: 75 TABLET, FILM COATED ORAL at 08:22

## 2020-12-03 ASSESSMENT — PAIN SCALES - GENERAL
PAINLEVEL_OUTOF10: 0

## 2020-12-03 ASSESSMENT — ENCOUNTER SYMPTOMS
EYES NEGATIVE: 1
GASTROINTESTINAL NEGATIVE: 1
SHORTNESS OF BREATH: 1
COUGH: 1
BLOOD IN STOOL: 0
WHEEZING: 0
CHEST TIGHTNESS: 0
STRIDOR: 0
NAUSEA: 0

## 2020-12-03 NOTE — PROGRESS NOTES
Nutrition Education    · Referral for CHF education, pt declined education, Printed information for 2gNa diet left with RDN name and phone #    Electronically signed by Vy Allen RD, LD on 12/3/20 at 11:42 AM EST

## 2020-12-03 NOTE — PROGRESS NOTES
INPATIENT PROGRESS NOTES    PATIENT NAME: Amee Schultz  MRN: [id number]  SERVICE DATE:  December 3, 2020   SERVICE TIME:  5:33 PM      PRIMARY SERVICE: Pulmonary Disease    CHIEF COMPLAIN: Shortness of breath    INTERVAL HPI: Patient seen and examined at bedside, Interval Notes, orders reviewed. Nursing notes noted  Patient said he is feeling terrible. He complains of shortness of breath with any exertion. He is also feeling weak. He has no chest pain. He has no fever. No nausea vomiting or diarrhea. His O2 saturation is 100% on room air      OBJECTIVE    Body mass index is 25.26 kg/m². PHYSICAL EXAM:  Vitals:  BP 93/67   Pulse 109   Temp 97.7 °F (36.5 °C) (Oral)   Resp 18   Ht 6' 5\" (1.956 m)   Wt 213 lb (96.6 kg)   SpO2 100%   BMI 25.26 kg/m²   General:Alert, awake . comfortable in bed, No distress. Head: Atraumatic , Normocephalic   Eyes: PERRL. No sclera icterus. No conjunctival injection. No discharge   ENT: No nasal  discharge. Pharynx clear. lips, teeth, mucosa and gums are normal, tongue protrudes in the midline  Neck:  Trachea midline. No thyromegaly, no JVD, No cervical adenopathy. Chest : Bilaterally symmetrical ,Normal effort,  No accessory muscle use  Lung : . Fair BS bilateral, decreased BS at bases. Few right basilar Rales. No wheezing. No rhonchi. Heart[de-identified] Normal  rate. Regular rhythm. No mumur ,  Rub or gallop  ABD: Non-tender. Non-distended. No masses. No organmegaly. Normal bowel sounds. No hernia.   Ext : No Pitting both leg , No Cyanosis No clubbing  Neuro: no focal weakness          DATA:   Recent Labs     12/01/20  0649 12/03/20 0614   WBC 4.9 5.8   HGB 8.7* 9.0*   HCT 26.2* 27.9*   .9* 102.9*   * 124*     Recent Labs     12/01/20  0649 12/03/20 0614   * 132*   K 5.3* 5.5*    100   CO2 20 19*   BUN 44* 53*   CREATININE 2.49* 2.51*   GLUCOSE 146* 103*   CALCIUM 8.8 9.2   LABGLOM 24.9* 24.7*   GFRAA 30.1* 29.8*       MV Settings:          No results for input(s): PHART, GOZ2QSD, PO2ART, EHJ6DCG, BEART, S1SKAFRH in the last 72 hours. O2 Device: None (Room air)    DIET GENERAL; No Added Salt (3-4 GM)     MEDICATIONS during current hospitalization:    Continuous Infusions:    Scheduled Meds:   carvedilol  3.125 mg Oral BID    midodrine  2.5 mg Oral BID WC    sacubitril-valsartan  1 tablet Oral BID    sodium chloride flush  10 mL Intravenous 2 times per day    enoxaparin  40 mg Subcutaneous Daily    azithromycin  500 mg Intravenous Q24H    And    cefTRIAXone (ROCEPHIN) IV  1 g Intravenous Q24H    clopidogrel  75 mg Oral Daily    atorvastatin  10 mg Oral Nightly       PRN Meds:sodium chloride flush, acetaminophen **OR** acetaminophen, polyethylene glycol, promethazine **OR** ondansetron, ALPRAZolam    Radiology  Xr Chest Portable    Result Date: 12/1/2020  EXAMINATION: XR CHEST PORTABLE. DATE AND TIME:12/1/2020 6:03 AM CLINICAL HISTORY: SHORTNESS OF BREATH   SOB  COMPARISONS: FEBRUARY 19, 2019  FINDINGS: Right-sided pleural thickening/effusion with right base infiltrate/atelectasis. Partially visualized thoracic external fixation rods. Left lung clear. No pneumothorax. RIGHT-SIDED PLEURAL PARENCHYMAL CHANGES, AGE INDETERMINATE. IMPRESSION AND SUGGESTION:  1. Community-acquired pneumonia  2. Combined systolic and diastolic congestive heart failure  3. Hypertension  4. Hyperlipidemia  5. COVID-19, ruled out    He is currently on Rocephin 1 g every 24 hourly and Zithromax 500 mg every 24 hourly. Room air O2 saturation 100 %. Chest x-ray shows right-sided pleural parenchymal changes. No new change overnight.   Continue present treatment plan      Electronically signed by Danielle Ferreira MD, FCCP on 12/3/2020 at 5:33 PM

## 2020-12-03 NOTE — PROGRESS NOTES
Progress Note  Patient: Mary Mccall  Unit/Bed: C721/E429-36  YOB: 1935  MRN: [id number]  Acct: [de-identified]   Admitting Diagnosis: Pneumonia [J18.9]  Admit Date:  12/1/2020  Hospital Day: 2    Chief Complaint: CAP AF ICM    Histories:  Past Medical History:   Diagnosis Date    Combined systolic and diastolic congestive heart failure ([facility] 2/21/2019    Constipation x 2weeks 6/18/2014    Diverticulosis of sigmoid colon 02/03/2016    DR. MANZO    Hypercholesterolemia     LBP (low back pain) 6/18/2014    LBP (low back pain) 6/18/2014    OA (osteoarthritis)     Polyp, colonic 02/03/2016    DR. MANZO    Prostate cancer ([facility]     Smoking hx     SOB (shortness of breath) 2/20/2019    Tinea manuum 7/22/2015    Type II or unspecified type diabetes mellitus without mention of complication, not stated as uncontrolled      Past Surgical History:   Procedure Laterality Date    AORTIC VALVE REPLACEMENT  11/15/2019    DR Young Lopez    CARDIAC CATHETERIZATION  06/13/2019    DR ALEJANDRA Eric CAROTID ENDARTERECTOMY  8/2/12    CATARACT REMOVAL WITH IMPLANT  9/9/13    [facility] DR. GRIDER     COLONOSCOPY  10/18/2012    [facility]    [facility]    UPPER GASTROINTESTINAL ENDOSCOPY  10/31/2017    DR ANAIS Eric WISDOM TOOTH EXTRACTION  1950     Family History   Problem Relation Age of Onset    Diabetes Mother     Heart Disease Mother     Allergy (Severe) Mother      Social History     Socioeconomic History    Marital status:      Spouse name: None    Number of children: None    Years of education: None    Highest education level: None   Occupational History    None   Social Needs    Financial resource strain: None    Food insecurity     Worry: None     Inability: None    Transportation needs     Medical: None     Non-medical: None   Tobacco Use    Smoking status: Former Smoker     Packs/day: 0.50     Years: 70.00     Pack years: 35.00     Types: Cigarettes    Smokeless tobacco: Never Used present. Cardiovascular: Normal rate, intact distal pulses and normal pulses. An irregularly irregular rhythm present. Murmur heard. Pulmonary/Chest: Effort normal and breath sounds normal. He has no wheezes. He has no rales. He exhibits no tenderness. Abdominal: Soft. Bowel sounds are normal. There is no abdominal tenderness. Musculoskeletal: Normal range of motion. General: No tenderness or edema. Neurological: He is alert and oriented to person, place, and time. Skin: Skin is warm. No cyanosis. Nails show no clubbing.        LABS:  CBC:   Lab Results   Component Value Date    WBC 5.8 12/03/2020    RBC 2.71 12/03/2020    HGB 9.0 12/03/2020    HCT 27.9 12/03/2020    .9 12/03/2020    MCH 33.4 12/03/2020    MCHC 32.4 12/03/2020    RDW 14.5 12/03/2020     12/03/2020    MPV 11.2 03/23/2015     CBC with Differential:    Lab Results   Component Value Date    WBC 5.8 12/03/2020    RBC 2.71 12/03/2020    HGB 9.0 12/03/2020    HCT 27.9 12/03/2020     12/03/2020    .9 12/03/2020    MCH 33.4 12/03/2020    MCHC 32.4 12/03/2020    RDW 14.5 12/03/2020    LYMPHOPCT 15.3 12/01/2020    MONOPCT 9.4 12/01/2020    BASOPCT 0.8 12/01/2020    MONOSABS 0.5 12/01/2020    LYMPHSABS 0.7 12/01/2020    EOSABS 0.1 12/01/2020    BASOSABS 0.0 12/01/2020     CMP:    Lab Results   Component Value Date     12/03/2020    K 5.5 12/03/2020     12/03/2020    CO2 19 12/03/2020    BUN 53 12/03/2020    CREATININE 2.51 12/03/2020    GFRAA 29.8 12/03/2020    LABGLOM 24.7 12/03/2020    GLUCOSE 103 12/03/2020    GLUCOSE 115 02/27/2012    PROT 6.9 03/26/2019    LABALBU 4.1 03/26/2019    LABALBU 4.4 02/27/2012    CALCIUM 9.2 12/03/2020    BILITOT 0.5 03/26/2019    ALKPHOS 62 03/26/2019    AST 20 03/26/2019    ALT 16 03/26/2019     BMP:    Lab Results   Component Value Date     12/03/2020    K 5.5 12/03/2020     12/03/2020    CO2 19 12/03/2020    BUN 53 12/03/2020    LABALBU 4.1 03/26/2019 LABALBU 4.4 02/27/2012    CREATININE 2.51 12/03/2020    CALCIUM 9.2 12/03/2020    GFRAA 29.8 12/03/2020    LABGLOM 24.7 12/03/2020    GLUCOSE 103 12/03/2020    GLUCOSE 115 02/27/2012     Magnesium:  No results found for: MG  Troponin:  No results found for: [facility] Problems    Diagnosis Date Noted    Pneumonia [J18.9] 12/01/2020     Priority: Low        Assessment/Plan:  1. CAP- on iv ABX. 2. COVID x2 negative   3. BNP elevated due to CMP. Pt not in CHF clinically  4. Hyperkalemia  -slight worse 5.5 today. (Resumed Entresto yesterday )  If worse may need to stop Entresto  5. CAD- stable no angina  6. ICM EF 20-25 - will resume low dose BB and Entresto. along with Midodrine. F/u Echo EF 40%  7. Moderate MR  8. TAVR #34   9. Moderate PA HTN   10. AF- s/p Watchmen device. No need for A/C  11. PPM - again unclear as to why he did not receive ICD. Keep on Telemetry. 12. Resume Plavix and statin.    13. CKD  14. IS       Electronically signed by Jayne Chaudhry MD on 12/3/2020 at 9:48 AM

## 2020-12-03 NOTE — PROGRESS NOTES
Ambulated patient in hallway, but did not make it even 1/8 of the unit and patient was starting to get winded. Patient does not want to go to SNF or rehab upon discharge. Explained about possibly going home on oxygen for exertion and sleep and patient was agreeable. Will continue to monitor.

## 2020-12-03 NOTE — FLOWSHEET NOTE
2040 Shift assessment complete. VS are stable. BP is on the lower end, but pts states that is his norm. Pt is AxOx4. Meds are given per order. Denies SOB, Pain, N, V. Lungs are diminished. Call light within reach. Will continue to monitor. Electronically signed by Chante Wei RN on 12/3/2020     1874 Pt had a 5 beat [place] [address] [place]. Asymptomatic in bed.       0500 pt urinated 3x through the night. No BMs. Rested well. Will continue to monitor.      Electronically signed by Chante Wei RN on 12/3/2020 at 5:19 AM

## 2020-12-03 NOTE — PROGRESS NOTES
Department of Internal Medicine  General Internal Medicine  Attending Progress Note      SUBJECTIVE:  Pt seen and examined. Pt off O2 today, but gets very sob with exertion and feels very weak    OBJECTIVE      Medications    Current Facility-Administered Medications: carvedilol (COREG) tablet 3.125 mg, 3.125 mg, Oral, BID  midodrine (PROAMATINE) tablet 2.5 mg, 2.5 mg, Oral, BID WC  sacubitril-valsartan (ENTRESTO) 24-26 MG per tablet 1 tablet, 1 tablet, Oral, BID  sodium chloride flush 0.9 % injection 10 mL, 10 mL, Intravenous, 2 times per day  sodium chloride flush 0.9 % injection 10 mL, 10 mL, Intravenous, PRN  acetaminophen (TYLENOL) tablet 650 mg, 650 mg, Oral, Q6H PRN **OR** acetaminophen (TYLENOL) suppository 650 mg, 650 mg, Rectal, Q6H PRN  polyethylene glycol (GLYCOLAX) packet 17 g, 17 g, Oral, Daily PRN  promethazine (PHENERGAN) tablet 12.5 mg, 12.5 mg, Oral, Q6H PRN **OR** ondansetron (ZOFRAN) injection 4 mg, 4 mg, Intravenous, Q6H PRN  enoxaparin (LOVENOX) injection 40 mg, 40 mg, Subcutaneous, Daily  azithromycin (ZITHROMAX) 500 mg in D5W 250ml addavial, 500 mg, Intravenous, Q24H **AND** cefTRIAXone (ROCEPHIN) 1 g IVPB in 50 mL D5W minibag, 1 g, Intravenous, Q24H  clopidogrel (PLAVIX) tablet 75 mg, 75 mg, Oral, Daily  atorvastatin (LIPITOR) tablet 10 mg, 10 mg, Oral, Nightly  ALPRAZolam (XANAX) tablet 0.25 mg, 0.25 mg, Oral, TID PRN  Physical    VITALS:  BP 93/67   Pulse 109   Temp 97.7 °F (36.5 °C) (Oral)   Resp 18   Ht 6' 5\" (1.956 m)   Wt 213 lb (96.6 kg)   SpO2 100%   BMI 25.26 kg/m²   Constitutional: Awake and alert in no acute distress.  Lying in bed comfortably  Head: Normocephalic, atraumatic  Eyes: EOMI, PERRLA  ENT: moist mucous membranes  Neck: neck supple, trachea midline  Lungs: Good inspiratory effort, no wheeze, no rhonchi, no rales  Heart: RRR, normal S1 and S2  GI: Soft, non-distended, non tender, no guarding, no rebound, +BS  MSK: no edema noted  Skin: warm, dry  Psych: appropriate affect     Data    CBC:   Lab Results   Component Value Date    WBC 5.8 12/03/2020    RBC 2.71 12/03/2020    HGB 9.0 12/03/2020    HCT 27.9 12/03/2020    .9 12/03/2020    MCH 33.4 12/03/2020    MCHC 32.4 12/03/2020    RDW 14.5 12/03/2020     12/03/2020    MPV 11.2 03/23/2015     CMP:    Lab Results   Component Value Date     12/03/2020    K 5.5 12/03/2020     12/03/2020    CO2 19 12/03/2020    BUN 53 12/03/2020    CREATININE 2.51 12/03/2020    GFRAA 29.8 12/03/2020    LABGLOM 24.7 12/03/2020    GLUCOSE 103 12/03/2020    GLUCOSE 115 02/27/2012    PROT 6.9 03/26/2019    LABALBU 4.1 03/26/2019    LABALBU 4.4 02/27/2012    CALCIUM 9.2 12/03/2020    BILITOT 0.5 03/26/2019    ALKPHOS 62 03/26/2019    AST 20 03/26/2019    ALT 16 03/26/2019       ASSESSMENT AND PLAN      # CAP  - continuing Rocephin/azithro  - pulm consulted  - covid negative x2.  - O2 as needed. On room air    # Combined systolic and diastolic CHF - not acute  - lungs clear  - BNP elevated due to cardiomyopathy  - cardiology consulted  - echo ordered and pending    # DM2  - monitor. No insulin as patient diet controlled at home    # HTN, HLD  - cont home meds  - monitor for hypotension    # JASON on CKD3 vs progression to CKD4  - previous most recent labs were from February 2019, almost 2 years ago  - Cr now worsened to 2.5, but stable. Could be new baseline  - will continue to monitor    DVT: lovenox    Disposition: Cardiology and pulm consulted. PT/OT. Pt would benefit from rehab vs SNF as he is very decompensated, weak and with severe MARTINEZ, but has declined and is choosing [facility].        [facility] DO  Internal Medicine

## 2020-12-03 NOTE — PROGRESS NOTES
Physical Therapy Med Surg Daily Treatment Note  Fac[facility]/Department[facility]  [facility]       NAME: Jes Odom  : 1935 (9 y.o.)  MRN: [id number]  CODE STATUS: Full Code    Date of Service: 12/3/2020    Patient Diagnosis(es): Pneumonia [J18.9]   No chief complaint on file. Patient Active Problem List    Diagnosis Date Noted    Pneumonia 2020    S/P peripheral artery angioplasty 2019    Combined systolic and diastolic congestive heart failure ([facility] 2019    SOB (shortness of breath) 2019    Prostate cancer ([facility] 2018    Abnormality of gait 2017    Abdominal wall pain in right lower quadrant 2017    Postlaminectomy syndrome, lumbar 2017    [facility]    Left carotid artery stenosis 2016    Atherosclerosis of native coronary artery of native heart without angina pectoris 2016    Chronic stable angina ([facility] 2016    Abdominal wall bulge 2016    Pre-diabetes 2016    Tinea manuum 2015    After-cataract, obscuring vision 2015    Swollen abdomen 2014    OA (osteoarthritis of spine) 2014    Low back pain 2014    Constipation x 2weeks 2014    Retinal embolus, right eye 2013    Trigger finger, acquired 05/15/2012    Smoking hx     Hypercholesterolemia         Past Medical History:   Diagnosis Date    Combined systolic and diastolic congestive heart failure ([facility] 2019    Constipation x 2weeks 2014    Diverticulosis of sigmoid colon 2016    DR. MANZO    Hypercholesterolemia     LBP (low back pain) 2014    LBP (low back pain) 2014    OA (osteoarthritis)     Polyp, colonic 2016    DR. MANZO    Prostate cancer ([facility]     Smoking hx     SOB (shortness of breath) 2019    Tinea manuum 2015    Type II or unspecified type diabetes mellitus without mention of complication, not stated as uncontrolled      Past Surgical History:   Procedure Laterality Date    AORTIC VALVE REPLACEMENT  11/15/2019    DR Yen Velasquez CARDIAC CATHETERIZATION  06/13/2019    DR ROBERTS   [facility] CAROTID ENDARTERECTOMY  8/2/12    CATARACT REMOVAL WITH IMPLANT  9/9/13    [facility] DR. GRIDER     COLONOSCOPY  10/18/2012    [facility]    UPPER GASTROINTESTINAL ENDOSCOPY  10/31/2017    DR MANZO   [facility] WISDOM TOOTH EXTRACTION  1950       Restrictions  Restrictions/Precautions: Fall Risk    SUBJECTIVE   General  Chart Reviewed: Yes  Family / Caregiver Present: No  Subjective  Subjective: i get so out of breath when i do anything. if i am just laying here i am fine. General Comment  Comments: SP02 96%,  BPM prior to tx    Pre-Session Pain Report     Pain Screening  Patient Currently in Pain: No  Pre Treatment Pain Screening  Pain at present: 0  Scale Used: Numeric Score  Intervention List: Patient able to continue with treatment    Post-Session Pain Report  Pain Assessment  Pain Assessment: 0-10  Pain Level: 0         OBJECTIVE        Bed mobility  Rolling to Left: Stand by assistance  Rolling to Right: Stand by assistance  Supine to Sit: Stand by assistance  Sit to Supine: Stand by assistance  Scooting: Stand by assistance  Comment: increased effort and SOB with movement. pt with increased anxiety and VC for pt to slow down breathing and to not hold his breath with movement. Transfers  Sit to Stand: Stand by assistance  Stand to sit: Stand by assistance  Comment: increased effort to complete. pt SOB. VC for breathing techs. Ambulation  Ambulation?: Yes  More Ambulation?: No  Ambulation 1  Surface: level tile  Device: Rolling Walker  Assistance: Stand by assistance  Quality of Gait: short shuffling steps, slow gely, FF posture, downward gaze, occasional standing rest breaks due to SOB and fatigue. decreased endurance. Distance: 25ft X 2  Comments: SOB with gait which determined gait distance. assistance= pt performs 75% or more of the activity; assistance is required to complete the activity  Moderate assistance= pt performs 50% of the activity; assistance is required to complete the activity  Maximal assistance = pt performs 25% of the activity; assistance is required to complete the activity  Dependent = pt requires total physical assistance to accomplish the task

## 2020-12-04 VITALS
HEIGHT: 77 IN | TEMPERATURE: 97.7 F | WEIGHT: 213 LBS | RESPIRATION RATE: 16 BRPM | DIASTOLIC BLOOD PRESSURE: 68 MMHG | HEART RATE: 92 BPM | OXYGEN SATURATION: 94 % | BODY MASS INDEX: 25.15 KG/M2 | SYSTOLIC BLOOD PRESSURE: 90 MMHG

## 2020-12-04 LAB
ANION GAP SERPL CALCULATED.3IONS-SCNC: 14 MEQ/L (ref 9–15)
BUN BLDV-MCNC: 55 MG/DL (ref 8–23)
CALCIUM SERPL-MCNC: 9 MG/DL (ref 8.5–9.9)
CHLORIDE BLD-SCNC: 100 MEQ/L (ref 95–107)
CO2: 19 MEQ/L (ref 20–31)
CREAT SERPL-MCNC: 2.41 MG/DL (ref 0.7–1.2)
GFR AFRICAN AMERICAN: 31.3
GFR NON-AFRICAN AMERICAN: 25.8
GLUCOSE BLD-MCNC: 99 MG/DL (ref 70–99)
HCT VFR BLD CALC: 26.2 % (ref 42–52)
HEMOGLOBIN: 8.7 G/DL (ref 14–18)
MCH RBC QN AUTO: 34.2 PG (ref 27–31.3)
MCHC RBC AUTO-ENTMCNC: 33.4 % (ref 33–37)
MCV RBC AUTO: 102.3 FL (ref 80–100)
PDW BLD-RTO: 14.1 % (ref 11.5–14.5)
PLATELET # BLD: 123 K/UL (ref 130–400)
POTASSIUM REFLEX MAGNESIUM: 5.5 MEQ/L (ref 3.4–4.9)
RBC # BLD: 2.56 M/UL (ref 4.7–6.1)
SODIUM BLD-SCNC: 133 MEQ/L (ref 135–144)
WBC # BLD: 6.1 K/UL (ref 4.8–10.8)

## 2020-12-04 PROCEDURE — 97116 GAIT TRAINING THERAPY: CPT

## 2020-12-04 PROCEDURE — 6360000002 HC RX W HCPCS: Performed by: FAMILY MEDICINE

## 2020-12-04 PROCEDURE — 99232 SBSQ HOSP IP/OBS MODERATE 35: CPT | Performed by: INTERNAL MEDICINE

## 2020-12-04 PROCEDURE — 80048 BASIC METABOLIC PNL TOTAL CA: CPT

## 2020-12-04 PROCEDURE — 6370000000 HC RX 637 (ALT 250 FOR IP): Performed by: FAMILY MEDICINE

## 2020-12-04 PROCEDURE — 94761 N-INVAS EAR/PLS OXIMETRY MLT: CPT

## 2020-12-04 PROCEDURE — 6370000000 HC RX 637 (ALT 250 FOR IP): Performed by: INTERNAL MEDICINE

## 2020-12-04 PROCEDURE — 99233 SBSQ HOSP IP/OBS HIGH 50: CPT | Performed by: INTERNAL MEDICINE

## 2020-12-04 PROCEDURE — 2580000003 HC RX 258: Performed by: FAMILY MEDICINE

## 2020-12-04 PROCEDURE — 36415 COLL VENOUS BLD VENIPUNCTURE: CPT

## 2020-12-04 PROCEDURE — 85027 COMPLETE CBC AUTOMATED: CPT

## 2020-12-04 RX ORDER — MIDODRINE HYDROCHLORIDE 5 MG/1
5 TABLET ORAL 2 TIMES DAILY WITH MEALS
Status: DISCONTINUED | OUTPATIENT
Start: 2020-12-04 | End: 2020-12-04 | Stop reason: HOSPADM

## 2020-12-04 RX ORDER — SODIUM POLYSTYRENE SULFONATE 15 G/60ML
15 SUSPENSION ORAL; RECTAL ONCE
Status: COMPLETED | OUTPATIENT
Start: 2020-12-04 | End: 2020-12-04

## 2020-12-04 RX ORDER — LEVOFLOXACIN 750 MG/1
750 TABLET ORAL EVERY OTHER DAY
Qty: 2 TABLET | Refills: 0 | Status: SHIPPED | OUTPATIENT
Start: 2020-12-04 | End: 2020-12-07

## 2020-12-04 RX ORDER — HYDROXYZINE HYDROCHLORIDE 25 MG/1
25 TABLET, FILM COATED ORAL EVERY 8 HOURS PRN
Qty: 30 TABLET | Refills: 0 | Status: SHIPPED | OUTPATIENT
Start: 2020-12-04 | End: 2020-12-14

## 2020-12-04 RX ADMIN — ENOXAPARIN SODIUM 40 MG: 40 INJECTION SUBCUTANEOUS at 08:35

## 2020-12-04 RX ADMIN — ALPRAZOLAM 0.25 MG: 0.25 TABLET ORAL at 08:36

## 2020-12-04 RX ADMIN — CARBIDOPA AND LEVODOPA 2.5 MG: 50; 200 TABLET, EXTENDED RELEASE ORAL at 08:35

## 2020-12-04 RX ADMIN — CEFTRIAXONE SODIUM 1 G: 1 INJECTION, POWDER, FOR SOLUTION INTRAMUSCULAR; INTRAVENOUS at 16:50

## 2020-12-04 RX ADMIN — CLOPIDOGREL BISULFATE 75 MG: 75 TABLET, FILM COATED ORAL at 08:35

## 2020-12-04 RX ADMIN — SODIUM POLYSTYRENE SULFONATE 15 G: 15 SUSPENSION ORAL; RECTAL at 06:10

## 2020-12-04 ASSESSMENT — ENCOUNTER SYMPTOMS
GASTROINTESTINAL NEGATIVE: 1
STRIDOR: 0
BLOOD IN STOOL: 0
COUGH: 1
CHEST TIGHTNESS: 0
WHEEZING: 0
EYES NEGATIVE: 1
NAUSEA: 0
SHORTNESS OF BREATH: 1

## 2020-12-04 ASSESSMENT — PAIN SCALES - GENERAL: PAINLEVEL_OUTOF10: 0

## 2020-12-04 NOTE — PROGRESS NOTES
Patient assessed and charted on by this RN. Vital signs are stable. Low BP patient stated no coreg if BP under 100. Patient denies any pain or nausea. Patient states Xanax helped with his anxiety and is requesting if he can have something like it or similar to have when he goes home. Lung sounds are diminished. Tele on. Fall precautions are in place. Call light in reach. Will continue to monitor. Patient incontinent of urine. Bed pad and brief changed. Messaged NP at midnight about elevated potassium. Messaged Dr. Flavia Ramos at [phone] about elevated potassium.

## 2020-12-04 NOTE — PROGRESS NOTES
Substance and Sexual Activity    Alcohol use: Yes    Drug use: No    Sexual activity: Yes     Partners: Female   Lifestyle    Physical activity     Days per week: None     Minutes per session: None    Stress: None   Relationships    Social connections     Talks on phone: None     Gets together: None     Attends Synagogue service: None     Active member of club or organization: None     Attends meetings of clubs or organizations: None     Relationship status: None    Intimate partner violence     Fear of current or ex partner: None     Emotionally abused: None     Physically abused: None     Forced sexual activity: None   Other Topics Concern    None   Social History Narrative    None       Subjective/HPI  Walking well with walker. Legs feel well. Still coughing but easing up. Breathing is better. Not hungry. EKG: AF 80s with occ V Pacing. Review of Systems:   Review of Systems   Constitutional: Negative. Negative for diaphoresis and fatigue. HENT: Negative. Eyes: Negative. Respiratory: Positive for cough and shortness of breath. Negative for chest tightness, wheezing and stridor. Cardiovascular: Negative. Negative for chest pain, palpitations and leg swelling. Gastrointestinal: Negative. Negative for blood in stool and nausea. Genitourinary: Negative. Musculoskeletal: Negative. Skin: Negative. Neurological: Positive for weakness. Negative for dizziness, syncope and light-headedness. Hematological: Negative. Psychiatric/Behavioral: Negative. Physical Examination:    BP 90/68   Pulse 92   Temp 97.7 °F (36.5 °C) (Oral)   Resp 16   Ht 6' 5\" (1.956 m)   Wt 213 lb (96.6 kg)   SpO2 94%   BMI 25.26 kg/m²    Physical Exam   Constitutional: He appears healthy. No distress. HENT:   Normal cephalic and Atraumatic   Eyes: Pupils are equal, round, and reactive to light. Neck: Normal range of motion and thyroid normal. Neck supple. No JVD present.  No neck adenopathy. No thyromegaly present. Cardiovascular: Normal rate, intact distal pulses and normal pulses. An irregularly irregular rhythm present. Murmur heard. Pulmonary/Chest: Effort normal and breath sounds normal. He has no wheezes. He has no rales. He exhibits no tenderness. Abdominal: Soft. Bowel sounds are normal. There is no abdominal tenderness. Musculoskeletal: Normal range of motion. General: No tenderness or edema. Neurological: He is alert and oriented to person, place, and time. Skin: Skin is warm. No cyanosis. Nails show no clubbing.        LABS:  CBC:   Lab Results   Component Value Date    WBC 6.1 12/04/2020    RBC 2.56 12/04/2020    HGB 8.7 12/04/2020    HCT 26.2 12/04/2020    .3 12/04/2020    MCH 34.2 12/04/2020    MCHC 33.4 12/04/2020    RDW 14.1 12/04/2020     12/04/2020    MPV 11.2 03/23/2015     CBC with Differential:    Lab Results   Component Value Date    WBC 6.1 12/04/2020    RBC 2.56 12/04/2020    HGB 8.7 12/04/2020    HCT 26.2 12/04/2020     12/04/2020    .3 12/04/2020    MCH 34.2 12/04/2020    MCHC 33.4 12/04/2020    RDW 14.1 12/04/2020    LYMPHOPCT 15.3 12/01/2020    MONOPCT 9.4 12/01/2020    BASOPCT 0.8 12/01/2020    MONOSABS 0.5 12/01/2020    LYMPHSABS 0.7 12/01/2020    EOSABS 0.1 12/01/2020    BASOSABS 0.0 12/01/2020     CMP:    Lab Results   Component Value Date     12/04/2020    K 5.5 12/04/2020     12/04/2020    CO2 19 12/04/2020    BUN 55 12/04/2020    CREATININE 2.41 12/04/2020    GFRAA 31.3 12/04/2020    LABGLOM 25.8 12/04/2020    GLUCOSE 99 12/04/2020    GLUCOSE 115 02/27/2012    PROT 6.9 03/26/2019    LABALBU 4.1 03/26/2019    LABALBU 4.4 02/27/2012    CALCIUM 9.0 12/04/2020    BILITOT 0.5 03/26/2019    ALKPHOS 62 03/26/2019    AST 20 03/26/2019    ALT 16 03/26/2019     BMP:    Lab Results   Component Value Date     12/04/2020    K 5.5 12/04/2020     12/04/2020    CO2 19 12/04/2020    BUN 55 12/04/2020 LABALBU 4.1 03/26/2019    LABALBU 4.4 02/27/2012    CREATININE 2.41 12/04/2020    CALCIUM 9.0 12/04/2020    GFRAA 31.3 12/04/2020    LABGLOM 25.8 12/04/2020    GLUCOSE 99 12/04/2020    GLUCOSE 115 02/27/2012     Magnesium:  No results found for: MG  Troponin:  No results found for: [facility] Problems    Diagnosis Date Noted    Pneumonia [J18.9] 12/01/2020     Priority: Low        Assessment/Plan:  1. CAP- on iv ABX. - improving  2. COVID x2 negative   3. BNP elevated due to CMP. Pt not in CHF clinically  4. Hyperkalemia  -stable at 5.5 today. 5. CAD- stable no angina  6. ICM EF 20-25 - resumed Coreg and Entresto. Coreg held due to Low BP yesterday. Will advance Midodrine to 5 bid. 7. Moderate MR  8. TAVR #34   9. Moderate PA HTN   10. AF- s/p Watchmen device. No need for A/C  11. PPM - again unclear as to why he did not receive ICD. Upon dc he will f/u with [facility] Cardi[facility] at [facility]. In anyevent his f/u EF is 40% and there is no need for ICD at this time. 12. Resumed Plavix and statin. 13. CKD  14. IS  15. CV wise - stable for dc anytime.         Electronically signed by Meenakshi López MD on 12/4/2020 at 9:20 AM

## 2020-12-04 NOTE — DISCHARGE SUMMARY
Physician Discharge Summary     Patient ID:  Lata Nam  [id number]  33 y.o.  1935    Admit date: 12/1/2020    Discharge date : 12/04/20     Admitting Physician: Rakel Fountain MD     Discharge Physician: Rocio Crawford DO     Admission Diagnoses: Pneumonia [J18.9]    Discharge Diagnoses: CAP    Admission Condition: fair    Discharged Condition: fair    Hospital Course: 82 y.o. 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 as a transfer from [facility] emergency room. Patient states he has had approximately 3 weeks of progressively worsening shortness of breath. However he has been managing at home and still able to ambulate. Over the last 24 hours he has been unable to ambulate more than a few steps without becoming extremely short of breath. He denies any leg swelling. He denies fever and chills. Does report cough nonproductive. He does state he had multiple family members over for Thanksgiving from out of town however all of them were tested for Covid prior to arrival.  Nobody had any symptoms of respiratory illness. He currently denies chest pain, states shortness of breath is minimal at rest however increases with activity. ED workup showed elevated creatinine and BNP of 22,000. CXR showed R sided infiltrate vs atelectasis. Initial COVID was negative. Pt was given IV lasix, started on Rocephin/Azithro and admitted for further management. Cardiology was consulted and did not believe patient to be in acute CHF exacerbation and held lasix. Repeat COVID was negative and isolation precautions were removed. Pt continued to be very dyspneic on exertion.  Echo performed showed EF 81% and diastolic dysfx. Last labs prior to admission were from Feb of 2019 and renal function remained stable during admission and thought to be likely progression of his CKD3. Pt was weaned to room air, but still with severe symptoms with exertion despite not dropping SpO2. Pt with severe anxiety attacks which were managed with very low dose xanax in the hospital. PT/OT recommended SNF, but pt declined and was agreeable to [place] [id number]. Home O2 eval was completed prior to discharge. Pt's dyspnea likely multifactorial between pneumonia and deconditioning as patient has become more sedentary over weeks due to cough and sob. Will likely need multiple weeks to regain strength and lung function. Pt stable and discharged home on 12/4 with Rx for Levaquin to complete abx course for community acquired pneumonia and will follow up with PCP after discharge. Consults: cardiology      Discharge Exam:  See daily progress note    Labs:   Recent Labs     12/03/20  0614 12/04/20  0705   WBC 5.8 6.1   HGB 9.0* 8.7*   HCT 27.9* 26.2*   * 123*     Recent Labs     12/03/20  0614 12/04/20  0705   * 133*   K 5.5* 5.5*    100   CO2 19* 19*   BUN 53* 55*   CREATININE 2.51* 2.41*   CALCIUM 9.2 9.0     No results for input(s): AST, ALT, BILIDIR, BILITOT, ALKPHOS in the last 72 hours. No results for input(s): INR in the last 72 hours. No results for input(s): Deo Widen in the last 72 hours. Urinalysis:   Lab Results   Component Value Date    BLOODU NEG 07/20/2017    SPECGRAV 1.025 07/20/2017    GLUCOSEU NEG 07/20/2017       Radiology:   Most recent    Chest CT      WITH CONTRAST:No results found for this or any previous visit. WITHOUT CONTRAST: No results found for this or any previous visit.     CXR      2-view:   Results for orders placed in visit on 04/30/19   XR CHEST STANDARD (2 VW)        Portable:   Results for orders placed during the hospital encounter of 12/01/20   XR CHEST PORTABLE    Narrative EXAMINATION: XR CHEST PORTABLE. DATE AND TIME:12/1/2020 6:03 AM     CLINICAL HISTORY: SHORTNESS OF BREATH   SOB      COMPARISONS: FEBRUARY 19, 2019     FINDINGS: Right-sided pleural thickening/effusion with right base infiltrate/atelectasis. Partially visualized thoracic external fixation rods. Left lung clear. No pneumothorax. Impression RIGHT-SIDED PLEURAL PARENCHYMAL CHANGES, AGE INDETERMINATE. Echo No results found for this or any previous visit.     Disposition: home    In process/preliminary results:  Outstanding Order Results     Date and Time Order Name Status Description    12/1/2020 0649 Culture, Blood 2 Preliminary     12/1/2020 0649 Culture, Blood 1 Preliminary           Patient Instructions:   Discharge Medication List as of 12/4/2020  5:51 PM      START taking these medications    Details   levoFLOXacin (LEVAQUIN) 750 MG tablet Take 1 tablet by mouth every other day for 2 doses, Disp-2 tablet,R-0Normal      hydrOXYzine (ATARAX) 25 MG tablet Take 1 tablet by mouth every 8 hours as needed for Anxiety, Disp-30 tablet,R-0Normal         CONTINUE these medications which have NOT CHANGED    Details   Coenzyme Q10 (CO Q-10) 100 MG CAPS Take 1 capsule by mouth dailyHistorical Med      midodrine (PROAMATINE) 2.5 MG tablet Take 2.5 mg by mouth dailyHistorical Med      carvedilol (COREG) 3.125 MG tablet Take 3.125 mg by mouth daily Hold if SBP <100Historical Med      ENTRESTO 24-26 MG per tablet 1 tablet 2 times daily , DAWHistorical Med      clopidogrel (PLAVIX) 75 MG tablet TAKE ONE TABLET BY MOUTH EVERY MORNING, Disp-90 tablet, R-3Normal      furosemide (LASIX) 40 MG tablet Take 1 tablet by mouth 2 times daily, Disp-180 tablet, R-3Normal      atorvastatin (LIPITOR) 10 MG tablet TAKE ONE TABLET BY MOUTH EVERY DAY, Disp-90 tablet, R-3Normal      ferrous sulfate 325 (65 Fe) MG tablet Take 325 mg by mouth daily (with breakfast) Historical Med      vitamin B-12 (CYANOCOBALAMIN) 1000 MCG tablet Take 1,000 mcg by mouth daily Historical Med      tamsulosin (FLOMAX) 0.4 MG capsule Take 1 capsule by mouth nightly, Disp-90 capsule, R-0Normal      esomeprazole (NEXIUM) 40 MG delayed release capsule TAKE ONE CAPSULE BY MOUTH EVERY MORNING BEFORE BREAKFAST, Disp-90 capsule, R-1Normal      finasteride (PROSCAR) 5 MG tablet TAKE ONE TABLET BY MOUTH EVERY DAY, Disp-90 tablet, R-1Normal         STOP taking these medications       HYDROcodone-acetaminophen (NORCO) 5-325 MG per tablet Comments:   Reason for Stopping:         spironolactone (ALDACTONE) 25 MG tablet Comments:   Reason for Stopping:         rivaroxaban (XARELTO) 15 MG TABS tablet Comments:   Reason for Stopping:         metOLazone (ZAROXOLYN) 10 MG tablet Comments:   Reason for Stopping:         lidocaine (LMX) 4 % cream Comments:   Reason for Stopping:         gabapentin (NEURONTIN) 100 MG capsule Comments:   Reason for Stopping:         metoprolol succinate (TOPROL XL) 50 MG extended release tablet Comments:   Reason for Stopping:         albuterol sulfate  (90 Base) MCG/ACT inhaler Comments:   Reason for Stopping:             Activity: activity as tolerated  Diet: cardiac diet  Wound Care: none needed    Follow-up with Bridger Ball DO  in 1 week.     DC time 35 minutes    Signed:  Electronically signed by Moira Sanchez DO on 12/4/2020 at 6:17 PM

## 2020-12-04 NOTE — PROGRESS NOTES
Patient assessment complete. After having panic attack at end of my shift yesterday we decided to give a xanax with morning meds before a panic attack hits and patient agreed. Patient also expressed concerns about going home with something for anxiety and I will talk to the doctor about this. Patients vitals stable, patient hypotensive but that is his baseline and midodrine given and coreg held. Will continue to monitor.

## 2020-12-04 NOTE — PROGRESS NOTES
Room air at rest 93%  While ambulating on room air patients sats remained 92-96%  Does not qualify for home oxygen

## 2020-12-04 NOTE — PROGRESS NOTES
Department of Internal Medicine  General Internal Medicine  Attending Progress Note      SUBJECTIVE:  Pt seen and examined. Pt off O2 today, but gets very sob with exertion and feels very weak. Pt was set to be discharged today, but ride fell through and pt did not feel comfortable with taxi/[employer] due to covid. Will plan on discharge tomorrow. OBJECTIVE      Medications    Current Facility-Administered Medications: midodrine (PROAMATINE) tablet 5 mg, 5 mg, Oral, BID WC  carvedilol (COREG) tablet 3.125 mg, 3.125 mg, Oral, BID  sacubitril-valsartan (ENTRESTO) 24-26 MG per tablet 1 tablet, 1 tablet, Oral, BID  sodium chloride flush 0.9 % injection 10 mL, 10 mL, Intravenous, 2 times per day  sodium chloride flush 0.9 % injection 10 mL, 10 mL, Intravenous, PRN  acetaminophen (TYLENOL) tablet 650 mg, 650 mg, Oral, Q6H PRN **OR** acetaminophen (TYLENOL) suppository 650 mg, 650 mg, Rectal, Q6H PRN  polyethylene glycol (GLYCOLAX) packet 17 g, 17 g, Oral, Daily PRN  promethazine (PHENERGAN) tablet 12.5 mg, 12.5 mg, Oral, Q6H PRN **OR** ondansetron (ZOFRAN) injection 4 mg, 4 mg, Intravenous, Q6H PRN  enoxaparin (LOVENOX) injection 40 mg, 40 mg, Subcutaneous, Daily  azithromycin (ZITHROMAX) 500 mg in D5W 250ml addavial, 500 mg, Intravenous, Q24H **AND** cefTRIAXone (ROCEPHIN) 1 g IVPB in 50 mL D5W minibag, 1 g, Intravenous, Q24H  clopidogrel (PLAVIX) tablet 75 mg, 75 mg, Oral, Daily  atorvastatin (LIPITOR) tablet 10 mg, 10 mg, Oral, Nightly  ALPRAZolam (XANAX) tablet 0.25 mg, 0.25 mg, Oral, TID PRN  Physical    VITALS:  BP 90/68   Pulse 92   Temp 97.7 °F (36.5 °C) (Oral)   Resp 16   Ht 6' 5\" (1.956 m)   Wt 213 lb (96.6 kg)   SpO2 94%   BMI 25.26 kg/m²   Constitutional: Awake and alert in no acute distress.  Lying in bed comfortably  Head: Normocephalic, atraumatic  Eyes: EOMI, PERRLA  ENT: moist mucous membranes  Neck: neck supple, trachea midline  Lungs: Good inspiratory effort, no wheeze, no rhonchi, no rales  Heart: RRR, normal S1 and S2  GI: Soft, non-distended, non tender, no guarding, no rebound, +BS  MSK: no edema noted  Skin: warm, dry  Psych: appropriate affect     Data    CBC:   Lab Results   Component Value Date    WBC 6.1 12/04/2020    RBC 2.56 12/04/2020    HGB 8.7 12/04/2020    HCT 26.2 12/04/2020    .3 12/04/2020    MCH 34.2 12/04/2020    MCHC 33.4 12/04/2020    RDW 14.1 12/04/2020     12/04/2020    MPV 11.2 03/23/2015     CMP:    Lab Results   Component Value Date     12/04/2020    K 5.5 12/04/2020     12/04/2020    CO2 19 12/04/2020    BUN 55 12/04/2020    CREATININE 2.41 12/04/2020    GFRAA 31.3 12/04/2020    LABGLOM 25.8 12/04/2020    GLUCOSE 99 12/04/2020    GLUCOSE 115 02/27/2012    PROT 6.9 03/26/2019    LABALBU 4.1 03/26/2019    LABALBU 4.4 02/27/2012    CALCIUM 9.0 12/04/2020    BILITOT 0.5 03/26/2019    ALKPHOS 62 03/26/2019    AST 20 03/26/2019    ALT 16 03/26/2019       ASSESSMENT AND PLAN      # CAP  - continuing Rocephin/azithro- PO levaquin at discharge, renally dosed  - pulm consulted  - covid negative x2.  - O2 as needed. On room air    # Combined systolic and diastolic CHF - not acute  - lungs clear  - BNP elevated due to cardiomyopathy  - cardiology consulted  - resume entresto and carvedilol as BP allows    # DM2  - monitor. No insulin as patient diet controlled at home    # HTN, HLD  - cont home meds  - monitor for hypotension    # JASON on CKD3 vs progression to CKD4  - previous most recent labs were from February 2019, almost 2 years ago  - Cr now 2.4, but stable. Could be new baseline  - will continue to monitor    DVT: lovenox    Disposition: Cardiology and pulm consulted. PT/OT. Pt would benefit from rehab vs SNF as he is very decompensated, weak and with severe MARTINEZ, but has declined and is choosing HHC. Was set for discharge but ride fell through so will plan on discharge tomorrow.       Alanis Hunter, DO  Internal Medicine

## 2020-12-04 NOTE — PROGRESS NOTES
Physical Therapy Med Surg Daily Treatment Note  Facility/Department: [facility]  [facility]       NAME: Flex Plummer  : 1935 (19 y.o.)  MRN: [id number]  CODE STATUS: Full Code    Date of Service: 2020    Patient Diagnosis(es): Pneumonia [J18.9]   No chief complaint on file. Patient Active Problem List    Diagnosis Date Noted    Pneumonia 2020    S/P peripheral artery angioplasty 2019    Combined systolic and diastolic congestive heart failure ([facility] 2019    SOB (shortness of breath) 2019    [facility] 2018    Abnormality of gait 2017    Abdominal wall pain in right lower quadrant 2017    Postlaminectomy syndrome, lumbar 2017    VT (ventricular tachycardia) ([facility] 2016    Left carotid artery stenosis 2016    Atherosclerosis of native coronary artery of native heart without angina pectoris 2016    Chronic stable angina ([facility] 2016    Abdominal wall bulge 2016    Pre-diabetes 2016    Tinea manuum 2015    After-cataract, obscuring vision 2015    Swollen abdomen 2014    OA (osteoarthritis of spine) 2014    Low back pain 2014    Constipation x 2weeks 2014    Retinal embolus, right eye 2013    Trigger finger, acquired 05/15/2012    Smoking hx     Hypercholesterolemia         Past Medical History:   Diagnosis Date    Combined systolic and diastolic congestive heart failure ([facility] 2019    Constipation x 2weeks 2014    Diverticulosis of sigmoid colon 2016    DR. MANZO    Hypercholesterolemia     LBP (low back pain) 2014    LBP (low back pain) 2014    OA (osteoarthritis)     Polyp, colonic 2016    DR. MANZO    Prostate cancer ([facility]     Smoking hx     SOB (shortness of breath) 2019    Tinea manuum 2015    Type II or unspecified type diabetes mellitus without mention of complication, not stated as uncontrolled      Past Surgical History:   Procedure Laterality Date    AORTIC VALVE REPLACEMENT  11/15/2019    DR Trisha Patel CARDIAC CATHETERIZATION  06/13/2019    DR ALEJANDRA Guallpa See CAROTID ENDARTERECTOMY  8/2/12    CATARACT REMOVAL WITH IMPLANT  9/9/13    F DR. GRIDER     COLONOSCOPY  10/18/2012    COLONOSCOPY  2/3/16    [facility]    UPPER GASTROINTESTINAL ENDOSCOPY  10/31/2017    DR ANAIS Guallpa See WISDOM TOOTH EXTRACTION  1952       Restrictions  Restrictions/Precautions: Fall Risk    SUBJECTIVE   General  Chart Reviewed: Yes  Family / Caregiver Present: No  Subjective  Subjective: \"I was just getting to sleep, I'm really tired today. \"    Pre-Session Pain Report  Pre Treatment Pain Screening  Pain at present: 0  Intervention List: Patient able to continue with treatment  Pain Screening  Patient Currently in Pain: Denies       Post-Session Pain Report  Pain Assessment  Pain Level: 0         OBJECTIVE        Bed mobility  Rolling to Left: Supervision  Supine to Sit: Supervision  Sit to Supine: Supervision  Scooting: Supervision  Comment: Quick movements, vc's for improved technique, fair follow through. Transfers  Sit to Stand: Stand by assistance  Stand to sit: Stand by assistance  Comment: Vc's for hand placement    Ambulation  Ambulation?: Yes  More Ambulation?: No  Ambulation 1  Surface: level tile  Device: Rolling Walker  Assistance: Stand by assistance  Quality of Gait: short shuffling steps, slow dottie, FF posture, downward gaze  Gait Deviations: Slow Dottie;Decreased step length;Decreased step height  Distance: 28' x2  Comments: Pt only needing one short RB during ambulation, vc's for proper breathing technique, 95% SPO2 pre and post gait training      ASSESSMENT   Assessment: Pt demonstrates improved endurance this session secondary to increasing ambulatory distance and requiring less RB's during ambulation. Pt SPO2 remaining WNL throughout tx. Pt declining ther ex d/t feeling tired.  VC's for proper breathing technique utilized throughout tx with good follow through. Discharge Recommendations:  Continue to assess pending progress, Patient would benefit from continued therapy after discharge    Goals  Short term goals  Short term goal 1: Pt to complete HEP with indep (sink exes)  Short term goal 2: Pt to tolerate 5min standing activities without increased SOB  Long term goals  Long term goal 1: Pt to complete bed mobility with indep  Long term goal 2: Pt to complete transfers with indep  Long term goal 3: Pt to ambulate 150ft with LRD and indep  Long term goal 4: Pt to manage flight of steps with HR and indep    PLAN    Times per week: 3-6  Plan Comment: Cont. POC  Safety Devices  Type of devices: All fall risk precautions in place, Bed alarm in place, Left in bed, Call light within reach     [facility] (6 CLICK) Dioni 95 Raw Score : 19     Therapy Time   Individual   Time In 1030   Time Out 1045   Minutes 15      BM/Trsf: 6  Gait: [address] [facility], [facility], 12/04/20 at 10:53 AM         Definitions for assistance levels  Independent = pt does not require any physical supervision or assistance from another person for activity completion. Device may be needed.   Stand by assistance = pt requires verbal cues or instructions from another person, close to but not touching, to perform the activity  Minimal assistance= pt performs 75% or more of the activity; assistance is required to complete the activity  Moderate assistance= pt performs 50% of the activity; assistance is required to complete the activity  Maximal assistance = pt performs 25% of the activity; assistance is required to complete the activity  Dependent = pt requires total physical assistance to accomplish the task

## 2020-12-04 NOTE — PROGRESS NOTES
INPATIENT PROGRESS NOTES    PATIENT NAME: Prateek Haji  MRN: [id number]  SERVICE DATE:  December 4, 2020   SERVICE TIME:  5:22 PM      PRIMARY SERVICE: Pulmonary Disease    CHIEF COMPLAIN: Shortness of breath    INTERVAL HPI: Patient seen and examined at bedside, Interval Notes, orders reviewed. Nursing notes noted  Patient was seen earlier he is feeling better. No complaint of shortness of breath at rest though he does get short of breath with any exertionHe has no chest pain. He has no fever. No nausea vomiting or diarrhea. His O2 saturation is 94% on room air      OBJECTIVE    Body mass index is 25.26 kg/m². PHYSICAL EXAM:  Vitals:  BP 90/68   Pulse 92   Temp 97.7 °F (36.5 °C) (Oral)   Resp 16   Ht 6' 5\" (1.956 m)   Wt 213 lb (96.6 kg)   SpO2 94%   BMI 25.26 kg/m²   General:Alert, awake . comfortable in bed, No distress. Head: Atraumatic , Normocephalic   Eyes: PERRL. No sclera icterus. No conjunctival injection. No discharge   ENT: No nasal  discharge. Pharynx clear. lips, teeth, mucosa and gums are normal, tongue protrudes in the midline  Neck:  Trachea midline. No thyromegaly, no JVD, No cervical adenopathy. Chest : Bilaterally symmetrical ,Normal effort,  No accessory muscle use  Lung : . Fair BS bilateral, decreased BS at bases. Few right basilar Rales. No wheezing. No rhonchi. Heart[de-identified] Normal  rate. Regular rhythm. No mumur ,  Rub or gallop  ABD: Non-tender. Non-distended. No masses. No organmegaly. Normal bowel sounds. No hernia.   Ext : No Pitting both leg , No Cyanosis No clubbing  Neuro: no focal weakness          DATA:   Recent Labs     12/03/20  0614 12/04/20  0705   WBC 5.8 6.1   HGB 9.0* 8.7*   HCT 27.9* 26.2*   .9* 102.3*   * 123*     Recent Labs     12/03/20  0614 12/04/20  0705   * 133*   K 5.5* 5.5*    100   CO2 19* 19*   BUN 53* 55*   CREATININE 2.51* 2.41*   GLUCOSE 103* 99   CALCIUM 9.2 9.0   LABGLOM 24.7* 25.8*   GFRAA 29.8* 31.3*       MV Settings:

## 2020-12-06 LAB
BLOOD CULTURE, ROUTINE: NORMAL
CULTURE, BLOOD 2: NORMAL

## 2020-12-07 NOTE — PROGRESS NOTES
Physical Therapy  Facility/Department: [facility] MED SURG [id number]  Physical Therapy Discharge      NAME: Jes Odom    : 1935 (23 y.o.)  MRN: [id number]    Account: [de-identified]  Gender: male      Patient has been discharged from acute care hospital. DC patient from current PT program.      Electronically signed by Shameka Colorado PT on 20 at 2:54 PM EST

## 2020-12-07 NOTE — CARE COORDINATION
12/7/2020: Pneumonia booklet and zones sheet mailed to patient's home along with Care Transition Nurse(CTN) contact information. CTN will phone the patient to review literature and provide follow up.
CALL PLACED TO  HOME CARE INTAKE, SPOKE WITH INTAKE AND SENT TO VOICEMAIL FOR \"TRIAGE\", LEFT VOICEMAIL FOR ALEXANDRA TO INQUIRE IF PATIENT HAS BEEN ACCEPTED, ALSO FAXED HOME CARE ORDER. PER NOTE FROM [facility] ON 12/1 STATES  DECLINED PATIENT AND IS NOW SET UP WITH [facility].     12/5 LATE ENTRY,  DECLINED TO ACCEPT AS PATIENT ON 12/4 AS WELL, CALL PLACE TO [facility],JANICE POWERS
CALL TO PATIENTS ROOM AND PT ASKED FOR CM TO CALL BACK AS HE IS TALKING TO HIS DAUGHTER
FAXED [phone]
FAXES NOT GOING THROUGH ON MAIN FAX. CALL TO  AND WILL NEED FAX [phone] AS OTHER MACHINE MAY BE TOO BUSY.  INFO FAXED
Met with patient, freedom of choice offered. States , \"I Newt Burows go home\". Will need home o2 eval and order signed if 02 needed.
Patient becomes SOB with exertion and will require oxygen at discharge. Order needs to be signed, Dr. Surinder Fajardo will do so on 12/4. Medical Services has been faxed all info. Aleks Mims has been faxed all info, will need to call and follow to ensure acceptance. Anticipated discharge 12/4.   Electronically signed by La Nena Heath RN on 12/3/2020 at 4:16 PM
Phone call to [facility] and spoke to Abrahan Lines [phone]. Faxed referral to [phone].
Social work note:  Spoke with patient to update him that [facility] has an opening on Sunday or Monday. Patient reports he is interested in [facility]. Provided [place] [facility] list. He was able to choose [facility]. Will update  [employer].  Electronically signed by TYRONE Amos on 12/3/2020 at 11:04 AM
Social work note: Spoke with [facility] which had been a referral to update them that patient went with [facility]. Spoke with Víctor Ruelas who did report they do have beds if things change.  Electronically signed by TYRONE Bonilla on 12/4/2020 at 1:42 PM
Care is related to the following treatment goals:     Initial Discharge Plan? (Note: please see concurrent daily documentation for any updates after initial note). HOME, DECLINED NEED FOR SNF. AGREEABLE TO [facility]. WANTS [facility] #1 AND [facility] 2ND.      The Patient and/or patient representative: PATIENT was provided with choice of any post-acute providers for care and equipment and agrees with discharge plan  Yes  LOW RISK FOR READMIT    Electronically signed by Jose Giron RN on 12/1/2020 at 12:08 PM

## 2020-12-07 NOTE — PROGRESS NOTES
Physical Therapy  Facility/Department: [facility] [id number]  Physical Therapy Discharge      NAME: Flex Plummer    : 1935 (65 y.o.)  MRN: [id number]    Account: [de-identified]  Gender: male      Patient has been discharged from acute care hospital. DC patient from current PT program.      Electronically signed by Lucy Dey PT on 20 at 2:55 PM EST

## 2023-09-06 VITALS
RESPIRATION RATE: 20 BRPM | SYSTOLIC BLOOD PRESSURE: 109 MMHG | DIASTOLIC BLOOD PRESSURE: 61 MMHG | TEMPERATURE: 97.7 F | OXYGEN SATURATION: 96 % | HEART RATE: 94 BPM

## 2023-09-07 VITALS
HEIGHT: 77 IN | RESPIRATION RATE: 16 BRPM | WEIGHT: 205.25 LBS | BODY MASS INDEX: 24.23 KG/M2 | TEMPERATURE: 97.2 F | HEART RATE: 87 BPM | DIASTOLIC BLOOD PRESSURE: 61 MMHG | SYSTOLIC BLOOD PRESSURE: 93 MMHG | OXYGEN SATURATION: 98 %

## 2023-09-07 VITALS
HEART RATE: 61 BPM | BODY MASS INDEX: 25.38 KG/M2 | RESPIRATION RATE: 18 BRPM | HEIGHT: 77 IN | WEIGHT: 214.95 LBS | DIASTOLIC BLOOD PRESSURE: 60 MMHG | OXYGEN SATURATION: 100 % | SYSTOLIC BLOOD PRESSURE: 98 MMHG | TEMPERATURE: 98.4 F

## 2023-09-08 VITALS
HEART RATE: 98 BPM | OXYGEN SATURATION: 99 % | DIASTOLIC BLOOD PRESSURE: 72 MMHG | BODY MASS INDEX: 24.36 KG/M2 | RESPIRATION RATE: 16 BRPM | WEIGHT: 206.35 LBS | SYSTOLIC BLOOD PRESSURE: 120 MMHG | HEIGHT: 77 IN | TEMPERATURE: 97.3 F

## 2023-09-08 VITALS
DIASTOLIC BLOOD PRESSURE: 68 MMHG | OXYGEN SATURATION: 99 % | HEART RATE: 97 BPM | TEMPERATURE: 98.2 F | WEIGHT: 209.88 LBS | RESPIRATION RATE: 20 BRPM | HEIGHT: 77 IN | BODY MASS INDEX: 24.78 KG/M2 | SYSTOLIC BLOOD PRESSURE: 113 MMHG